# Patient Record
(demographics unavailable — no encounter records)

---

## 2024-10-08 NOTE — HISTORY OF PRESENT ILLNESS
[FreeTextEntry1] : Mr. Rivers is a pleasant 74 year old gentleman with a past medical history significant for CKD, DM II, HTN, HLD, TY, s/p JAY lobectomy for hemoptysis (2015), Hepatitis C s/p liver transplant, LBBB, CMP, CAD s/p PCI / stent (LAD) 7/2024 with Dr. Vieyra and paroxysmal atrial fibrillation with RVR who presents for initial evaluation.   He was in cardiac rehab on 7/24/24 and was noted to be in new atrial fibrillation and was sent to the ED where ECG confirmed AF.  He was started on Xarelto 15 mg daily and ASA was stopped. Subsequent LTM 7/2024 revealed a 4% AF burden but he denies any awareness of rapid/irregular heart action, dizziness, presyncope/syncope.    Of note, he had a newly reduced LVEF 6/2024 25-30%.  Now s/p stent 7/2024.  EF has recovered to 45%.  Participating in cardiac rehab three days per week.  At home he also does 30 minutes on the [employer], enjoys playing golf and softball.    TY is being treated with hypertonic saline.

## 2024-10-08 NOTE — CARDIOLOGY SUMMARY
[de-identified] : 10/8/24 SR @ 86 bpm, LBBB QRS duration 134 ms  [de-identified] : Yg 7/31 - 8/14/24: SR (46-), PAF 4% burden with HR range 53-), longest 5 hours 56 mins [de-identified] : TTE 9/17/24 1. Left ventricular systolic function is moderately decreased with an ejection fraction visually estimated at 45 % and Kaba's biplane of 45% 2. Abnormal septal motion consistent with left bundle branch block. 3. There is mild (grade 1) left ventricular diastolic dysfunction. 4. Left atrium is mildly dilated. 5. Trace mitral regurgitation. 6. Mild tricuspid regurgitation. 7. Trace pulmonic regurgitation. 8. Estimated pulmonary artery systolic pressure is 18 mmHg (IVC is not well visualized, RAP estimated 3mmHg). 9. Compared to prior done on 6/21/2024, increase in EF, septum consistent with LBBB. 10. Apical septal segment, apical lateral segment, and apex are abnormal.  TTE 6/23/24 1. Left ventricular systolic function is moderately to severely decreased with an ejection fraction of 31 % by Kaba's method of disks with an ejection fraction visually estimated at 25 to 30 %. 2. There is moderate (grade 2) left ventricular diastolic dysfunction. 3. Entire inferior wall, Entire inferoseptal wall, basal and mid anterior septum, apical anterior segment is abnormal. Severly hypokinetic apex. 4. The left atrium is mildly dilated. 5. Aortic arch is not well visualized. 6. There is focal calcification of the aortic valve leaflets (LCC) 7. Fibrocalcific aortic valve sclerosis without stenosis. 8. Thickened mitral valve leaflets. 9. Moderate mitral regurgitation. Regurg fraction=24% 10. Mild to moderate tricuspid regurgitation. 11. Mild pulmonic regurgitation. 12. Estimated pulmonary artery systolic pressure is 33 mmHg. 13. No pericardial effusion seen. 14. Normal pericardium. 15. Compared to outside prior on 11/2019- Decrease in overall LVEF with multiple segmental wall motion abnormalities.

## 2024-10-08 NOTE — DISCUSSION/SUMMARY
[FreeTextEntry1] : Mr. Rivers is a pleasant 74 year old gentleman with a past medical history significant for CKD, DM II, HTN, HLD, TY, s/p JAY lobectomy for hemoptysis (2015), Hepatitis C s/p liver transplant, LBBB, CMP, CAD s/p PCI / stent (LAD) 7/2024 with Dr. Vieyra and paroxysmal atrial fibrillation with RVR who presents for initial evaluation.   1.  Atrial fibrillation We discussed in detail the normal conduction system of the heart and what atrial fibrillation is.  We discussed the natural progression of this arrhythmia in the context of any existing comorbidities.  We also discussed the association between atrial fibrillation and thrombotic events / stroke.   We discussed treatment options for paroxysmal atrial fibrillation including rate control vs. rhythm control with antiarrhythmic medications or an ablation.  The patent was counseled on the fact that there is no cure for atrial fibrillation and that for long term control of atrial fibrillation a combination of strategies if often used.  Regardless of treatment options and maintenance of sinus rhythm, anticoagulation is still recommended based on CHADSVASC score.     Based on recent studies, atrial fibrillation is recommended as an option for first line therapy in patients with reduced LV function and those who cannot tolerate medical therapy.     Success in rhythm control management is best defined as improved quality of life, maintenance of sinus rhythm and reduced hospitalization since not all patients have continuous monitoring and .or symptoms to diagnose sub-clinical episodes.  Modern day ablation likely results in better long term outcomes compared to medical therapy alone, but repeat procedures and/or addition of medications may be required.  Results of ablation are better for paroxysmal patients compared to persistent patients, which are better than long-standing persistent patients.  Typical 3-5 year success rates (freedom from clinical atrial fibrillation) based on available literature over multiple procedures were quoted to the patient at approximately 80-90% for paroxysmal, 60-80% for persistent and 40-60% for long standing persistent.     The patient defers rhythm intervention at this time.  I strongly recommended continuous heart rhythm assessment with either an Apple watch or ILR; he will consider an apple watch.  This will help guide interventions as appears to be asymptomatic when he is in AFib.  2.  Stroke Risk CHADS VASc at least 5 Continue Eliquis for TE/CVA ppx  F/U 3 months, sooner as needed

## 2024-10-08 NOTE — HISTORY OF PRESENT ILLNESS
[FreeTextEntry1] : Mr. Rivers is a pleasant 74 year old gentleman with a past medical history significant for CKD, DM II, HTN, HLD, TY, s/p JAY lobectomy for hemoptysis (2015), Hepatitis C s/p liver transplant, LBBB, CMP, CAD s/p PCI / stent (LAD) 7/2024 with Dr. Vieyra and paroxysmal atrial fibrillation with RVR who presents for initial evaluation.   He was in cardiac rehab on 7/24/24 and was noted to be in new atrial fibrillation and was sent to the ED where ECG confirmed AF.  He was started on Xarelto 15 mg daily and ASA was stopped. Subsequent LTM 7/2024 revealed a 4% AF burden but he denies any awareness of rapid/irregular heart action, dizziness, presyncope/syncope.    Of note, he had a newly reduced LVEF 6/2024 25-30%.  Now s/p stent 7/2024.  EF has recovered to 45%.  Participating in cardiac rehab three days per week.  At home he also does 30 minutes on the [employer], enjoys playing golf and softball.    TY is being treated with hypertonic saline.

## 2024-10-08 NOTE — ADDENDUM
[FreeTextEntry1] :  I, Kannan Scott, personally performed the services described in the documentation, reviewed the documentation recorded by the scribe in my presence and it accurately and completely records my words and actions.

## 2024-10-09 NOTE — PLAN
[FreeTextEntry1] : HR 60 and beta blocked. Sinus no afib EF now 45. To have monitor with Apple watch for afib (4%) risk. Bleeding risk significant with many ecchymoses and 1 fall. To have discussion re: anticoagulation. LFTs stable Cr. elevated

## 2024-10-09 NOTE — PHYSICAL EXAM
[Alert] : alert [Scleral Icterus] : no scleral icterus [PERRLA] : pupils equal, round, reactive to light and accomodation [Hepatojugular Reflux] : no hepatojugular reflux [No Lymphadenopathy] : no lymphadenopathy [Normal Rate] : normal rate [Regular Rhythm] : regular rhythm [Abdominal Bruit] : no abdominal bruit [Ascites Fluid Wave] : no ascites fluid wave [Ascites Tense] : no ascites tense [Caput Medusae] : no caput medusae [Soft] : soft [No Edema] : no edema [No Skin Discoloration] : no skin discoloration [] : right femoral palpable [Spider Angioma] : no spider angioma [Jaundice] : no jaundice [Asterixis] : no asterixis [Hepatic Encephalopathy] : no hepatic encephalopathy [de-identified] : ecchymoses on abdomen [de-identified] : well healed no hernia [FreeTextEntry1] : Enzymes normal Cr. elevated though down from 2.1 now 1.7. standing Calcineurin and dm. Will follow with renal . No change. Hgb A1c  at 6.9. Discussed cr and dm, improved. Colonoscopy with re Diane. Everolimus 4.1 Hgb A1c 6.9 still would like improved. 40 mg atorvastatin. Great bleeding and ecchymoses and fall risk. Discussed with Dr. Looney. Doxi rx'd for Lyme's. Antibody remains +

## 2024-10-09 NOTE — HISTORY OF PRESENT ILLNESS
[Hepatitis C Virus] : Hepatitis C Virus [Liver] : Liver [TextBox_42] : Afib  heart rate tach during stress cardiac rehab, and stent placement. On xeralto and plavix with bleeding from scalp and face> Easy bruisability. EF now is 45 Sept 17th. In sinus now s/p OLTX 2014. [FreeTextEntry1] : Mild wheezing on Right

## 2024-10-09 NOTE — END OF VISIT
[Time Spent: ___ minutes] : I have spent [unfilled] minutes of time on the encounter which excludes teaching and separately reported services. [] : I personally saw and examined this patient.  My history and physical is based on my own examination and interaction with the patient and those present with ~him/her~, on available medical records, as well as on the reports and observations of other members of the team.

## 2024-10-09 NOTE — REVIEW OF SYSTEMS
[Fever] : no fever [Sore throat] : no sore throat [Sclera anicteric] : sclera icteric [Cough] : cough [Dysuria] : no dysuria [Frequency] : no frequency [Headache] : no headache [Anemia] : no anemia

## 2024-10-16 NOTE — HISTORY OF PRESENT ILLNESS
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 Diabetes developed in 2005 while he was receiving interferon therapy for Hep C. he is not aware of having any complications of diabetes (last eye exam 8/2016). he is on glimepiride 4 mg/day. HbA1C today - 6.5%, glucose - 121 mg/dl. He reports no hypoglycemia. 12/7/17. The patient is feeling well and has no complaints. HbA1C today is 6%, glucose - 119 mg/dl. His current dose of prednisone is 4 mg/day. His most recent eye exam was a few weeks ago - reportedly, without retinopathy. 9/25/18. The patient is doing very well on glimepiride 2 mg/day. HbA1C on 7/6/18 was 6.1%, on 9/13/18 - 6.3%. Glucose today is 225 mg/dl. He is now on 3 mg of prednisone daily. His next ophthalm. exam is in 2 days. He reports no hypoglycemia. 4/4/19. The patient is doing well. he continues on glimepiride 2 mg/day. He reports no hypoglycemia; has lost 8 lb. HbA1C today is 6.6%, glucose - 174 mg/dl. 10/3/19. The patient is doing very well. He has recently developed floaters and flashes in the Rt eye - is seeing ophthalmologist today. HbA1C was 6.8% on 9/4/19; glucose today is 174 mg/dl. His weight is stable. He reports no hypoglycemia. 7/15/2020. The patient is feeling well. His diabetes is in good control, with average BSs around 105 mg/dl. He is on glimepiride 2 mg/day. He reports no hypoglycemia. HbA1C was 6.6% on 6/4/2020. He has lost 6 lb. 10/13/21. The patient is doing well - has  no complaints. His weight is stable. He continues on 1mg glimepiride/day. He reports no hypoglycemia. Next opthalm. exam is scheduled in 2 weeks. Blood tests tomorrow in Dr. Cowan's office. CGM data reviewed. No hypoglycemic or hyperglycemic range. Average for the last 90 days - 116 mg/dl. HbA1C today is 6.2%, glucose - 134 mg/dl. 11/16/22. The patient is doing well. He has lost 7 lb since his previous visit, 16 lb since his first visit here on 7/12/2017. HbA1C today is 6.3%, glucose - 139 mg/dl. BSs at home range 90 - 130 mg/dl. He reports no hypoglycemia. 11/8/23. The patient is doing well. He has no complaints. His weight is stable. he reports no hypoglycemia. Glucose today is 169 mg/dl. HbA1C was 6.8% on 9/28/23. He is on glimepiride 1 mg/day and Jardiance 5 mg/day. 10/16/24. The patient is doing well but he had two coronary stents implanted on 6/27/24. HbA1C today is 6.9%, glucose - 180 mg/dl. He reports no hypoglycemia. His weight is stable.

## 2024-10-16 NOTE — ASSESSMENT
[FreeTextEntry1] : DM, type 2. S/P liver transplant CAD Hyperlipidemia CKD  Will continue current management Medications refilled. F/U - 3-6 month Repatha demonstrated

## 2024-10-17 NOTE — HISTORY OF PRESENT ILLNESS
[FreeTextEntry1] : DESIRAE BIANCHI  is a 74 year old  M  History of CAD, CMP, LBBB hepatitis C. Prior liver transplant. TY. Chronic kidney disease. Non-insulin-dependent diabetes. Hypertension and hyperlipidemia.  Chronic dyspnea which relates to his TY Referred by his physicians in [facility]. He spends part time between [facility]. Also spends the kenny in [place].  He remains physically active. He routinely does elliptical for 30 minutes. Also does light weights. Plays softball and golf.  Cardiac catheterization was performed at [facility] with Dr. Vieyra. Cardiac catheterization had significant coronary artery disease. There was two-vessel disease of the LAD and a large diagonal. Otherwise there were luminal irregularities and a right dominant circulation. IVUS guided PCI of the LAD and diagonal with drug-eluting stents.  Now in cardiac rehab.  7/24/24 was in cardiac rehab noted to have new onset AF. Was sent to ER. EKG confirms AF. He was discharged on xarelto 15mg daily and is off aspirin. Also taking plavix. Reports concern about bleeding risk. His transplant specialist already asked him to stop playing baseball. Lab hg 15.2, k4.7, cr 1.74, trop neg, , TFT wnl Followup monitor PAF 4% burden with overall controlled rates.  9/11/24 presented to cardiac rehab with AF with RVR today. Rate 130s pt asx but sent home. EKG did confirm AF rate 114 bpm. He rested at home and HR normalized. EKG in office t SR with known LBBB. There was stress around this time.   Last seen in the office 2 weeks ago.  Echocardiogram has mild LV dysfunction improved at 45%. There is abnormal septal motion related to conduction disease. Mild tricuspid regurgitation. No pulmonary hypertension.  He completed cardiac rehab. He is heading to [place] over the winter.  He has recently seen his nephrologist and liver transplant team.  He also saw EP discussed AF ablation which he is essentially declining at this time. He bought apple watch and has not noted any AF since wearing it. He declines watchman or ILR.  He is bothered by nuisance bruising and superficial bleeding on plavix and xarelto (renal dosing). States he solares his hair and blood drips down his face, even getting a pimple causes bleeding, he has curtailed his usual activity no longer playing baseball.   On my review of his labs before starting AC in July until now hgb 15.2 --> 13--> 12.6 --> 11.7 He has been getting labs monthly and has planned next month as well.   Recent LDL 86 his atorvastatin was increased to 80mg daily. He developed significant diarrhea and went back to 40mg.  His LDL 9/26/24 is 74.  Of note previously had increased LFTs on zetia.   He is looking into ID specialist for [place].   Non-smoker. Former  of [place] house. Brother with prior ablation.

## 2024-10-17 NOTE — ASSESSMENT
[FreeTextEntry1] : DESIRAE BIANCHI is a 74 year old M who presents today Oct 14, 2024 with the above history and the following active issues:   I-CMP S/P PCI 7/2024  There is improvement in left ventricular function from prior echocardiogram. Prior echocardiogram has an ejection fraction of 30% now is 45% In 2019 his ejection fraction was described as normal  Now on increased dose of beta blocker. Tolerating well. On SGLT2i and ACEi.  Will cont to trend EF.  Emphasized rhythm control. Saw EP. Now with apple watch no recurrent AF. Cont to monitor.  Completed cardiac rehab There is concern for excessive bleeding and decline in hgb on plavix and renally dosed xarelto. > 3 mo since PCI will stop plavix and instead take aspirin 81mg daily.  Cont xarelto 15mg daily.  Has repeat labs incl CBC planned again in coming weeks.  Reviewed red flag symptoms which would warrant emergent medical evaluation.  Coordinate care with his other specialists nephro and transplant.  PAF RVR EP eval BBl and a/c Educated patient on pathophysiology of atrial fibrillation and natural progression as well as associated risk of cardioembolic event. Informed him on indications for long term anticoagulation.  Reviewed bleeding precautions. Declines ablation, ILR, watchman. Risk/benefits reviewed.  Apple watch monitoring. Reinvolve EP for recurrent PAF.  history of diabetes, chronic kidney disease hypertension and hyperlipidemia. Instructed to notify our office if any new symptoms. ACE inhibitor and SGLT2 inhibitor. Renal function may limit use of MRA. Long-term would prefer not to take sulfonylurea. followup endo  Moderate mitral regurgitation. Carotid, aortic atherosclerosis. CKD Monitor valvular heart disease LV size and function.  ER precautions reviewed Close clinical follow-up planned with Dr. Wilkes Any questions and concerns were addressed and resolved.  Sincerely,  TRE Cruz Patients history, testing, and plan reviewed with supervising MD: Dr. Abhinav Scruggs

## 2024-10-17 NOTE — REVIEW OF SYSTEMS
[Easy Bleeding] : a tendency for easy bleeding [Easy Bruising] : a tendency for easy bruising [Negative] : Psychiatric

## 2024-11-13 NOTE — ASSESSMENT
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 * Recheck labs. * Will consider adding kerendia. Will hold for now given relatively lower BP. Will consider starting 5 mg daily (1/2 the usual dose). Will consider when he returns from [place] in March 2025.  * May benefit from GLP1 agonist. Defer to Dr. Garcia.  * Therapies for kidney disease: blood pressure control; DM control; proteinuria reduction with ARB/ACEi; SGLT2i; other evidence-based therapies recommended including exercise, a plant-based lower oxalate diet, and 400 mcg folic acid daily * Cardiovascular disease prevention: counseling on healthy diet, physical activity, weight loss, alcohol limitation, blood pressure control; cholesterol therapy; cardiology evaluation/followup advised * A counseling information sheet on CKD has been given (which they have been instructed to read). * The patient has been counseled that chronic kidney disease is a significant condition and regular office follow-up with me (at least every 2-4 months for now) is important for monitoring and their health, and that it is their responsibility to make a follow-up appointment. * The patient has been counseled never to stop taking their medications without discussing it with me or another doctor. * The patient has been counseled on avoiding NSAIDs. * The patient has been counseled on risk of worsening kidney function and instructed to immediately call and speak with me and go immediately to ER with any severe symptoms, nausea, vomiting, diarrhea, chest pain, or shortness of breath.  # HTN controlled. * Cont lisinopril 20/20 for proteinuria/elevated BP in office. Given controlled BP, will avoid kerendia currently. * The patient's blood pressure was checked with the [employer] [id number] using the SPRINT trial protocol after sitting quietly in an empty room with arm supported, back supported, and feet on the floor for 5 minutes. The average of 3 readings were taken. * A counseling information sheet on blood pressure and staying healthy has been given (which they have been instructed to read). * The patient has been counseled to check their BP at home with an automatic arm cuff, write down the readings, and reach me directly on the phone immediately if they are persistently > 180 systolic or if SBP is less than 100 or if lightheadedness develops. They were counseled to bring in all blood pressure readings and medications next visit. * The patient has been counseled that regular office follow-up (at least every 2-3 months for now) is important for monitoring and for their health, and that it is their responsibility to make follow up appointments. * The patient also has been counseled that they must never stop or change any medications without discussing this with me (or another physician).   # Hyperchol. * Recheck next visit.  # Respiratory infection. Hx of TY. * Pulm followup.  # DM. * Recheck HGA1c.

## 2024-11-13 NOTE — HISTORY OF PRESENT ILLNESS
[FreeTextEntry1] : Kindly referred by Dr. Cowan for CKD.  * Events reviewed. S/P PCI of LAD and diagonal. Also dx with PAF. *  LVEF increased from 25 to 45 after PCI. * DM controlled. *  BP controlled.  - 110s at home. SOB with moderate exertion. No CP. No lightheadedness. Compliant with medications. * CKD stable, creatinine 1.78. eGFR 40. 132 mg albuminuria. K 4.4.   Previous history (19Jun24): * BP controlled.  - 120s at home. No SOB with exertion. No CP. No lightheadedness. Compliant with medications. * CKD previously stable, creatinine 1.62 in 19Mar. 98 mg albuminuria. * Breathing comfortably. Continuing inhalation therapy. * No further syncope.   Previous history (19Mar24): * Events reviewed. ER visits on March 6 and March 12 for respiratory illness with cough. Covid and Flu-. Chest CT c/w known TY. Treated with bronchodilators and azithromycin. Creatinine 2 on March 6 and 1.6 on March 12. On March 12 felt presyncopal and evaluated by EPS and sent home with monitor. No further episodes of presyncope or passing out. Wheezing now improved. * BP controlled.  No SOB with exertion. No CP. No lightheadedness. Compliant with medications.   Previous history (29Nov23): * Cr 1.93, eGFR 36 by CKD-EPIcr. 31 mg albuminuria. HG* BP controlled. /64 at home on average. No hypotension. No lightheadedness. No CP/SOB. Compliant with medications. * HGA1c 6.7. * Feels well on jardiance. * K 4.3. * . Atorvastatin 20 mg daily. Only takes 4x a week.   Previous history (23Aug23): * On Jardiance. Feels well. eGFR 36 ([id number]). 115 mg albuminuria. * DM controlled. HGA1c 7. * BP controlled. /64 at home. No lightheadedness. No CP/SOB. Compliant with medications. On amlodipine 2.5 only. On lisinopril 20/15.   Previous history (20Jul23): * BP controlled. BP 120s at home. No lightheadedness. No CP/SOB.Compliant with medications. * eGFR 41 ([id number]). 185 mg albuminuria. * DM controlled, HGA1c 6.9. * Bicarb increased to 1 bid. HCO3 18.   Previous history (23May23): * eGFR 43 (cystatin C 1.6, EKFC eGFRcys, [facility] 2023). * 128 mg albuminuria. * K 5.5. Now following low K diet. *  * HTN controlled. BP 120s at home. No lightheadedness. No CP/SOB. Compliant with medications. * Bicarb increased to 1 bid.   Previous history (29Mar23): Labs reviewed. Creatinine stable at 1.6 - 1.8 since 2021. Creatinine 1.3 - 1.4  in 2016, increased to 1.5 in 2017 and 1.7 in 2018. Most recent creatinine 1.86 (eGFR 38 by CKD-EPI). 1+ protein, no hematuria.   The patient denies exposure to chronic NSAIDs, chronic PPIs, green smoothies, creatine, or herbal supplements. The patient denies a history of kidney stones or pyelonephritis. 3-5x night nocturia. No recent renal ultrasound. They are unaware of proteinuria or hematuria.  OLTX related to hep C 2014. Tretaed with everolimus, tacrolimus 0.5 TIW with levels < 2. LFTs normal.   DM rx with glimperide 1 mg daily. HGA1c 7. DM dx 2005. No known retinopathy. No neuropathy.   * HTN borderline uncontrolled. BP 130s at home. No lightheadedness. No CP/SOB. Compliant with medications.   In 2017 he had a golf ball hit his right kidney with a hematoma (uncertain if retroperitoneal) and dx with HTN after that.   Brother has kidney disease of unclear cause requiring transplant. Also had hep c.   Hep C cured.

## 2024-11-13 NOTE — PHYSICAL EXAM
[General Appearance - Alert] : alert [General Appearance - In No Acute Distress] : in no acute distress [Neck Appearance] : the appearance of the neck was normal [Neck Cervical Mass (___cm)] : no neck mass was observed [Jugular Venous Distention Increased] : there was no jugular-venous distention [Thyroid Diffuse Enlargement] : the thyroid was not enlarged [Thyroid Nodule] : there were no palpable thyroid nodules [Heart Rate And Rhythm] : heart rate was normal and rhythm regular [Heart Sounds] : normal S1 and S2 [Heart Sounds Gallop] : no gallops [Murmurs] : no murmurs [Heart Sounds Pericardial Friction Rub] : no pericardial rub [Bowel Sounds] : normal bowel sounds [Abdomen Soft] : soft [Abdomen Tenderness] : non-tender [Abdomen Mass (___ Cm)] : no abdominal mass palpated [] : no hepato-splenomegaly [Cervical Lymph Nodes Enlarged Posterior Bilaterally] : posterior cervical [Cervical Lymph Nodes Enlarged Anterior Bilaterally] : anterior cervical [Supraclavicular Lymph Nodes Enlarged Bilaterally] : supraclavicular [FreeTextEntry1] : pitting ankles

## 2024-11-19 NOTE — HISTORY OF PRESENT ILLNESS
[FreeTextEntry1] : DESIRAE BIANCHI  is a 74 year old  M Last seen October 2024.  In the interim had follow-up with endocrinology A1c was 7.0.  Also seen by nephrology.  Coordinate care with outside specialist.  The interim he completed cardiac rehab.  He is back at [facility].  He does elliptical and rowing machine.  He is heading to [place] for the winter months.  He wears his Apple Watch.  There has been no reported atrial fibrillation.  He presently is on aspirin and Xarelto.  Reviewed long-term LDL goal and threshold.  Follow-up blood work when he returns.  Follow-up echocardiogram and ischemic evaluation.  History of CAD, CMP, LBBB hepatitis C. Prior liver transplant. TY. Chronic kidney disease. Non-insulin-dependent diabetes. Hypertension and hyperlipidemia.  Chronic dyspnea which relates to his TY Referred by his physicians in [facility]. He spends part time between [facility]. Also spends the kenny in [place].  Cardiac catheterization was performed at [facility] with Dr. Vieyra. Cardiac catheterization had significant coronary artery disease. There was two-vessel disease of the LAD and a large diagonal. Otherwise there were luminal irregularities and a right dominant circulation. IVUS guided PCI of the LAD and diagonal with drug-eluting stents.  7/24/24 was in cardiac rehab noted to have new onset AF. Was sent to ER. EKG confirms AF. He was discharged on xarelto 15mg daily and is off aspirin. Also taking plavix. Reports concern about bleeding risk. His transplant specialist already asked him to stop playing baseball. Lab hg 15.2, k4.7, cr 1.74, trop neg, , TFT wnl Followup monitor PAF 4% burden with overall controlled rates.  9/11/24 presented to cardiac rehab with AF with RVR Rate 130s pt asx but sent home. EKG did confirm AF rate 114 bpm. He rested at home and HR normalized. EKG in office  SR with known LBBB. There was stress around this time.   Echocardiogram has mild LV dysfunction improved at 45%. There is abnormal septal motion related to conduction disease. Mild tricuspid regurgitation. No pulmonary hypertension.  He completed cardiac rehab. He is heading to [place] over the winter.   He bought apple watch and has not noted any AF since wearing it.  Recent LDL 86 his atorvastatin was increased to 80mg daily. He developed significant diarrhea and went back to 40mg.  His LDL 9/26/24 is 74.  Of note previously had increased LFTs on zetia.   non-smoker. Former  of [place] house. Brother with prior ablation.  iCMP S/P PCI 7/2024  There is improvement in left ventricular function from prior echocardiogram. Prior echocardiogram has an ejection fraction of 30% now is 45% In 2019 his ejection fraction was described as normal  Now on increased dose of beta blocker. Tolerating well. On SGLT2i and ACEi.   PAF RVR BBl and a/c Educated patient on pathophysiology of atrial fibrillation and natural progression as well as associated risk of cardioembolic event. Informed him on indications for long term anticoagulation.  Reviewed bleeding precautions. Declines ablation, ILR, watchman. Apple watch monitoring. Reinvolve EP for recurrent PAF.  history of diabetes, chronic kidney disease hypertension and hyperlipidemia. Instructed to notify our office if any new symptoms. ACE inhibitor and SGLT2 inhibitor. Renal function may limit use of MRA. Long-term would prefer not to take sulfonylurea. followup endo  Moderate mitral regurgitation. Carotid, aortic atherosclerosis. CKD Monitor valvular heart disease LV size and function.  Any questions and concerns were addressed and resolved.

## 2024-11-19 NOTE — HISTORY OF PRESENT ILLNESS
[FreeTextEntry1] : DESIRAE BIANCHI  is a 74 year old  M Last seen October 2024.  In the interim had follow-up with endocrinology A1c was 7.0.  Also seen by nephrology.  Coordinate care with outside specialist.  The interim he completed cardiac rehab.  He is back at [facility].  He does elliptical and rowing machine.  He is heading to [place] for the winter months.  He wears his Apple Watch.  There has been no reported atrial fibrillation.  He presently is on aspirin and Xarelto.  Reviewed long-term LDL goal and threshold.  Follow-up blood work when he returns.  Follow-up echocardiogram and ischemic evaluation.  History of CAD, CMP, LBBB hepatitis C. Prior liver transplant. TY. Chronic kidney disease. Non-insulin-dependent diabetes. Hypertension and hyperlipidemia.  Chronic dyspnea which relates to his TY Referred by his physicians in [facility]. He spends part time between [facility]. Also spends the kenny in [place].  Cardiac catheterization was performed at [facility] with Dr. Vieyra. Cardiac catheterization had significant coronary artery disease. There was two-vessel disease of the LAD and a large diagonal. Otherwise there were luminal irregularities and a right dominant circulation. IVUS guided PCI of the LAD and diagonal with drug-eluting stents.  7/24/24 was in cardiac rehab noted to have new onset AF. Was sent to ER. EKG confirms AF. He was discharged on xarelto 15mg daily and is off aspirin. Also taking plavix. Reports concern about bleeding risk. His transplant specialist already asked him to stop playing baseball. Lab hg 15.2, k4.7, cr 1.74, trop neg, , TFT wnl Followup monitor PAF 4% burden with overall controlled rates.  9/11/24 presented to cardiac rehab with AF with RVR Rate 130s pt asx but sent home. EKG did confirm AF rate 114 bpm. He rested at home and HR normalized. EKG in office  SR with known LBBB. There was stress around this time.   Echocardiogram has mild LV dysfunction improved at 45%. There is abnormal septal motion related to conduction disease. Mild tricuspid regurgitation. No pulmonary hypertension.  He completed cardiac rehab. He is heading to [place] over the winter.   He bought apple watch and has not noted any AF since wearing it.  Recent LDL 86 his atorvastatin was increased to 80mg daily. He developed significant diarrhea and went back to 40mg.  His LDL 9/26/24 is 74.  Of note previously had increased LFTs on zetia.   non-smoker. Former  of [place] house. Brother with prior ablation.  iCMP S/P PCI 7/2024  There is improvement in left ventricular function from prior echocardiogram. Prior echocardiogram has an ejection fraction of 30% now is 45% In 2019 his ejection fraction was described as normal  Now on increased dose of beta blocker. Tolerating well. On SGLT2i and ACEi.   PAF RVR BBl and a/c Educated patient on pathophysiology of atrial fibrillation and natural progression as well as associated risk of cardioembolic event. Informed him on indications for long term anticoagulation.  Reviewed bleeding precautions. Declines ablation, ILR, watchman. Apple watch monitoring. Reinvolve EP for recurrent PAF.  history of diabetes, chronic kidney disease hypertension and hyperlipidemia. Instructed to notify our office if any new symptoms. ACE inhibitor and SGLT2 inhibitor. Renal function may limit use of MRA. Long-term would prefer not to take sulfonylurea. followup endo  Moderate mitral regurgitation. Carotid, aortic atherosclerosis. CKD Monitor valvular heart disease LV size and function.  Any questions and concerns were addressed and resolved.

## 2024-12-18 NOTE — PHYSICAL EXAM
[Alert] : alert [PERRLA] : pupils equal, round, reactive to light and accomodation [No Lymphadenopathy] : no lymphadenopathy [Normal Rate] : normal rate [Regular Rhythm] : regular rhythm [Soft] : soft [No Skin Discoloration] : no skin discoloration [] : right femoral palpable [Scleral Icterus] : no scleral icterus [Hepatojugular Reflux] : no hepatojugular reflux [Abdominal Bruit] : no abdominal bruit [Ascites Fluid Wave] : no ascites fluid wave [Ascites Tense] : no ascites tense [Caput Medusae] : no caput medusae [Spider Angioma] : no spider angioma [Jaundice] : no jaundice [Asterixis] : no asterixis [Hepatic Encephalopathy] : no hepatic encephalopathy [de-identified] : ecchymoses on abdomen gone [de-identified] : well healed no hernia [FreeTextEntry1] : Enzymes normal Cr. elevated though down from 2.1 now 1.85. standing Calcineurin and dm. Will follow with renal . No change. Hgb A1c  at 6.8. Discussed cr and dm, improved. Colonoscopy with re Diane. Everolimus 4.1 Hgb A1c 6.8-7.2 still would like improved. 40 mg atorvastatin. Discussed with Dr. Looney. Doxi rx'd for Lyme's. Antibody remains +. Trace edema.

## 2024-12-18 NOTE — REVIEW OF SYSTEMS
[Cough] : cough [Fever] : no fever [Sore throat] : no sore throat [Sclera anicteric] : sclera icteric [Dysuria] : no dysuria [Frequency] : no frequency [Headache] : no headache [Anemia] : no anemia

## 2024-12-18 NOTE — HISTORY OF PRESENT ILLNESS
[Hepatitis C Virus] : Hepatitis C Virus [Liver] : Liver [Not Working] : Not working [100: Normal, no complaints, no evidence of disease.] : 100: Normal, no complaints, no evidence of disease. [TextBox_42] : Afib  heart rate tach during stress cardiac rehab, and stent placement. On xeralto and plavix with bleeding from scalp and face> Easy bruisability. EF now is 45 Sept 17th. In sinus now s/p OLTX 2014. Mild edema and Cr.1.85 k 4.9, labs from 11/16: hgbA1c 6.8-7.2, ast 24 alt 36 tb .3 egfr 38 tacro <2. [FreeTextEntry1] : Mild wheezing on Right

## 2024-12-18 NOTE — PLAN
[FreeTextEntry1] : HR 66 and beta blocked. Sinus no afib EF now 45. To have monitor with Apple watch for afib (4%) risk. Bleeding risk significant with many ecchymoses and 1 fall. To have discussion re: anticoagulation now asa and xaralto. LFTs stable Cr. elevated, will need better glucose and bp control. Meds as per Dr. De La Rosa and Dr. Garcia. No change on immuno. Cholesterol and triglycerides in range.

## 2024-12-19 NOTE — HISTORY OF PRESENT ILLNESS
[FreeTextEntry1] : 73 y/o M with PMH of CAD, HTN, HLD, DMII, TY, A. fib (on Xeralto), Hep C, Primary liver cancer, BCC, PSHx of Liver transplant (2014), and Lung resection is referred by Dr. Cowan for hemorrhoids. He had the hemorrhoid for about 3 weeks. He used OTC creams initially and now he is using hydrocortisone and doing sitz baths. Hemorrhoids appeared after few days of constipation. Hemorrhoid is external. Pain is better now but still has pain. Sees some blood when wiping.  Bowel movements are daily x 1-2. Stools are soft. Denies need to strain. Has pain with BM. Pain is mostly during the bowel movements. Takes stool softeners.   Last colonoscopy was in 2023 and had no polyps.

## 2024-12-19 NOTE — PHYSICAL EXAM
[Wart] : a wart [Nonprolapsing] : a nonprolapsing (grade I) [Skin Tags] : residual hemorrhoidal skin tags were noted [Normal] : was normal [None] : there was no rectal mass  [Normal Breath Sounds] : Normal breath sounds [Normal Heart Sounds] : normal heart sounds [2+] : left 2+ [No Rash or Lesion] : No rash or lesion [Alert] : alert [Oriented to Person] : oriented to person [Oriented to Place] : oriented to place [Oriented to Time] : oriented to time [Calm] : calm [Abdomen Masses] : No abdominal masses [Abdomen Tenderness] : ~T No ~M abdominal tenderness [Excoriation] : no perianal excoriation [Fistula] : no fistulas [Ulcer ___ cm] : no ulcers [Tender, Swollen] : nontender, non-swollen [Thrombosed] : that was not thrombosed [Stool Sample Taken] : no stool obtained on rectal exam [Gross Blood] : no gross blood [de-identified] : benign, well healed scars, non distended [de-identified] : left posterior skin tag.  left lateral small warts and fibrotic changes causing narrowing of canal.  digital: tone WNL.  anoscopy (stricture scope): 3 mucosal warts,  no idiopathic fissure or sixable internal hemorrhoid seen   [de-identified] : NAD

## 2024-12-19 NOTE — REVIEW OF SYSTEMS
[Easy Bleeding] : a tendency for easy bleeding [Easy Bruising] : a tendency for easy bruising [Negative] : Endocrine [Fever] : no fever [Chills] : no chills [Feeling Poorly] : not feeling poorly [Feeling Tired] : not feeling tired [Recent Weight Gain (___ Lbs)] : no recent weight gain [Recent Weight Loss (___ Lbs)] : no recent weight loss [Shortness Of Breath] : no shortness of breath [Wheezing] : no wheezing [Cough] : no cough [SOB on Exertion] : no shortness of breath during exertion [Abdominal Pain] : no abdominal pain [Vomiting] : no vomiting [Constipation] : no constipation [Diarrhea] : no diarrhea [Dysuria] : no dysuria [Incontinence] : no incontinence [Hesitancy] : no urinary hesitancy [Nocturia] : no nocturia [Itching] : no itching [Confused] : no confusion [Convulsions] : no convulsions [Dizziness] : no dizziness [Fainting] : no fainting [Suicidal] : not suicidal [Anxiety] : no anxiety [Depression] : no depression

## 2024-12-19 NOTE — ASSESSMENT
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  Internal lesions as well.   Mild anal stenosis of unclear etiology. Had colonoscopy done recently.  Needs EUA, excision and fulguration. On xarelto for coated coronary stents that were place in July 2024.  Needs to be off x 3-4 days prior.  Need cardiology clearance.   DM, on jardiance:  Would need to stop 3 days prior as well. Needs cardiac, transplant, and general medical clearane. Information about warts give, High risk of recurrence explained and understood. Need for frequent surveilance understood.

## 2024-12-19 NOTE — REASON FOR VISIT
[Initial Evaluation] : an initial evaluation [Family Member] : family member [FreeTextEntry1] : hemorrhoid

## 2024-12-24 NOTE — HISTORY OF PRESENT ILLNESS
[Atrial Fibrillation] : atrial fibrillation [Coronary Artery Disease] : coronary artery disease [No Adverse Anesthesia Reaction] : no adverse anesthesia reaction in self or family member [Chronic Anticoagulation] : chronic anticoagulation [Chronic Kidney Disease] : chronic kidney disease [Diabetes] : diabetes [(Patient denies any chest pain, claudication, dyspnea on exertion, orthopnea, palpitations or syncope)] : Patient denies any chest pain, claudication, dyspnea on exertion, orthopnea, palpitations or syncope [Good (7-10 METs)] : Good (7-10 METs) [No Pertinent Pulmonary History] : no history of asthma, COPD, sleep apnea, or smoking [Aortic Stenosis] : no aortic stenosis [Recent Myocardial Infarction] : no recent myocardial infarction [Implantable Device/Pacemaker] : no implantable device/pacemaker [FreeTextEntry1] : EUA, excision and fulguration of Anal condylomata  [FreeTextEntry2] : 12/26/24 [FreeTextEntry3] : Dr. Benson [FreeTextEntry4] : Pt is a 75 y/o M with PMHx of Hep C s/p liver transplant, PAF, CAD s/p PCI / stent (LAD) 7/2024 who presents to the office today for medical clearance for EUA, excision and fulguration of Anal condylomata   Pt is feeling well.

## 2024-12-24 NOTE — ASSESSMENT
[High Risk Surgery - Intraperitoneal, Intrathoracic or Supringuinal Vascular Procedures] : High Risk Surgery - Intraperitoneal, Intrathoracic or Supringuinal Vascular Procedures - No (0) [Ischemic Heart Disease] : Ischemic Heart Disease  - Yes (1) [Congestive Heart Failure] : Congestive Heart Failure - No (0) [Prior Cerebrovascular Accident or TIA] : Prior Cerebrovascular Accident or TIA - No (0) [Insulin-dependent Diabetic (1 Point)] : Insulin-dependent Diabetic - No (0) [Creatinine >= 2mg/dL (1 Point)] : Creatinine >= 2mg/dL - No (0) [1] : 1 , RCRI Class: II, Risk of Post-Op Cardiac Complications: 6.0%, 95% CI for Risk Estimate: 4.9% - 7.4% [Patient Optimized for Surgery] : Patient optimized for surgery [No Further Testing Recommended] : no further testing recommended [As per surgery] : as per surgery [Modify medications prior to procedure] : Modify medications prior to procedure [FreeTextEntry4] : Pt is MODERATE risk for LOW-risk procedure. Pt may proceed with elective surgery. [FreeTextEntry7] : Pt has spoken to cardio and Transplant team. Stopping Xarelto, Jardiance since 12/22. He was instructed to reduce everolimus to 1 tab of 0.5 mg daily and increase tacrolimus to 1 tab 0.5 mg every morning and evening.   Patient is instructed to hold Glimepiride morning of surgery

## 2024-12-24 NOTE — HISTORY OF PRESENT ILLNESS
[Atrial Fibrillation] : atrial fibrillation [Coronary Artery Disease] : coronary artery disease [No Adverse Anesthesia Reaction] : no adverse anesthesia reaction in self or family member [Chronic Anticoagulation] : chronic anticoagulation [Chronic Kidney Disease] : chronic kidney disease [Diabetes] : diabetes [(Patient denies any chest pain, claudication, dyspnea on exertion, orthopnea, palpitations or syncope)] : Patient denies any chest pain, claudication, dyspnea on exertion, orthopnea, palpitations or syncope [Good (7-10 METs)] : Good (7-10 METs) [No Pertinent Pulmonary History] : no history of asthma, COPD, sleep apnea, or smoking [Aortic Stenosis] : no aortic stenosis [Recent Myocardial Infarction] : no recent myocardial infarction [Implantable Device/Pacemaker] : no implantable device/pacemaker [FreeTextEntry1] : EUA, excision and fulguration of Anal condylomata  [FreeTextEntry2] : 12/26/24 [FreeTextEntry3] : Dr. Benson [FreeTextEntry4] : Pt is a 73 y/o M with PMHx of Hep C s/p liver transplant, PAF, CAD s/p PCI / stent (LAD) 7/2024 who presents to the office today for medical clearance for EUA, excision and fulguration of Anal condylomata   Pt is feeling well.

## 2024-12-24 NOTE — ASSESSMENT
[High Risk Surgery - Intraperitoneal, Intrathoracic or Supringuinal Vascular Procedures] : High Risk Surgery - Intraperitoneal, Intrathoracic or Supringuinal Vascular Procedures - No (0) [Ischemic Heart Disease] : Ischemic Heart Disease  - Yes (1) [Congestive Heart Failure] : Congestive Heart Failure - No (0) [Prior Cerebrovascular Accident or TIA] : Prior Cerebrovascular Accident or TIA - No (0) [Creatinine >= 2mg/dL (1 Point)] : Creatinine >= 2mg/dL - No (0) [Insulin-dependent Diabetic (1 Point)] : Insulin-dependent Diabetic - No (0) [1] : 1 , RCRI Class: II, Risk of Post-Op Cardiac Complications: 6.0%, 95% CI for Risk Estimate: 4.9% - 7.4% [Patient Optimized for Surgery] : Patient optimized for surgery [No Further Testing Recommended] : no further testing recommended [As per surgery] : as per surgery [Modify medications prior to procedure] : Modify medications prior to procedure [FreeTextEntry4] : Pt is MODERATE risk for LOW-risk procedure. Pt may proceed with elective surgery. [FreeTextEntry7] : Pt has spoken to cardio and Transplant team. Stopping Xarelto, Jardiance since 12/22. He was instructed to reduce everolimus to 1 tab of 0.5 mg daily and increase tacrolimus to 1 tab 0.5 mg every morning and evening.   Patient is instructed to hold Glimepiride morning of surgery

## 2024-12-24 NOTE — END OF VISIT
[FreeTextEntry3] : I, Dr. Looney, personally performed the evaluation and management (E/M) services for this established patient who presents today with (a) new problem(s)/exacerbation of (an) existing condition(s).  That E/M includes conducting the examination, assessing all new/exacerbated conditions, and establishing a new plan of care.  Today, my PA, Kami Galeas, was here to observe my evaluation and management services for this new problem/exacerbated condition to be followed going forward.

## 2024-12-24 NOTE — PHYSICAL EXAM
[No Acute Distress] : no acute distress [Well-Appearing] : well-appearing [Normal Sclera/Conjunctiva] : normal sclera/conjunctiva [Normal Rate] : normal rate  [Regular Rhythm] : with a regular rhythm [Normal] : affect was normal and insight and judgment were intact

## 2024-12-30 NOTE — PHYSICAL EXAM
[General Appearance - In No Acute Distress] : no acute distress [] : no respiratory distress oral [Urethral Meatus] : meatus normal [Penis Abnormality] : normal circumcised penis [Oriented To Time, Place, And Person] : oriented to person, place, and time

## 2024-12-30 NOTE — ASSESSMENT
[FreeTextEntry1] : 74-year-old male presents with post-op urinary retention. Successful TOV today. Continue flomax x1 week. Can restart flomax if any difficulty urinating once stopping.   Hx elevated PSAs. Should f/u once healed from surgery to continue surveillance.   - continue tamsulosin x1 week - RTC 1 month (PVR, PSA)

## 2024-12-30 NOTE — HISTORY OF PRESENT ILLNESS
[FreeTextEntry1] : 74-year-old male presents with post-op urinary retention. S/p anal wart removal on 12/26 with Dr. Benson. Initally able to urinate ok in PACU but became increasingly more difficult in the evening. Presented to ED and cooper was placed. Started on tamsulosin 0.4mg. Prior to retention, no urinary issues. Has some nocturia but attributes it to high volume water intake during the evening. Is not bothered by the nocturia. Has complicated medical hx with s/p liver transplant, lung resection on immunosuppression. States had catheter after his transplant, but no difficulty urinating after removal.     TOV: - 240 cc sterile water instilled via existing catheter - Catheter removed in its entirety - Patient voided 200 cc  - Final  ml

## 2025-01-06 NOTE — ASSESSMENT
[FreeTextEntry1] : Post op visit. S/p anal wart excision on 12/26/24.  Pathology showed condyloma acuminata.  Pathology discussed with patient.  Seen with Dr. Benson Doing well overall.  Wound healing but not healed yet. No active bleeding or drainage noted.  Continue with Sitz baths, only 4-5 minutes.  Continue with Stool softeners.  RTC in 3 months for follow up when returns from [place].  Will need routine follow up every 3-4 months for longtime.

## 2025-01-06 NOTE — HISTORY OF PRESENT ILLNESS
[FreeTextEntry1] : 75 y/o M with PMH of CAD, HTN, HLD, DMII, TY, A. fib (on Xeralto), Hep C, Primary liver cancer, BCC, PSHx of Liver transplant (2014), and Lung resection is referred by Dr. Cowan for hemorrhoids.  On exam he was found to have left posterior skin tag, left lateral small warts and fibrotic changes causing narrowing of canal. On anoscopy (stricture scope): 3 mucosal warts seen. He is now s/p Exam under anesthesia, excision of anal lesions, question condylomata and excision of anal papilla x2 on 12/26/24. Post op complicated by urinary retention requiring cooper insertion. Cooper was removed on 12/30/24 by urologist. He is here for post op visit. He has been urinating freely.  Bowel movements are daily x2-3. Stools are soft. Had some blood on tissue once. Denies pain or bleeding otherwise.  Last colonoscopy was in 2023 and had no polyps.

## 2025-01-06 NOTE — PHYSICAL EXAM
[No Rash or Lesion] : No rash or lesion [Alert] : alert [Oriented to Person] : oriented to person [Oriented to Place] : oriented to place [Oriented to Time] : oriented to time [Calm] : calm [Purpura] : no purpura  [Petechiae] : no petechiae [Skin Ulcer] : no ulcer [Skin Induration] : no induration [de-identified] : NAD [de-identified] : Wound healing. No active bleeding or drainage noted.

## 2025-01-06 NOTE — ASSESSMENT
[FreeTextEntry1] : Post op visit. S/p anal wart excision on 12/26/24.  Pathology showed condyloma acuminata.  Pathology discussed with patient.  Seen with Dr. Benson Doing well overall.  Wound healing but not healed yet. No active bleeding or drainage noted.  Continue with Sitz baths, only 4-5 minutes.  Continue with Stool softeners.  RTC in 3 months for follow up when returns from [place].  Will need routine follow up every 3-4 months for longtime.

## 2025-01-06 NOTE — HISTORY OF PRESENT ILLNESS
[FreeTextEntry1] : 73 y/o M with PMH of CAD, HTN, HLD, DMII, TY, A. fib (on Xeralto), Hep C, Primary liver cancer, BCC, PSHx of Liver transplant (2014), and Lung resection is referred by Dr. Cowan for hemorrhoids.  On exam he was found to have left posterior skin tag, left lateral small warts and fibrotic changes causing narrowing of canal. On anoscopy (stricture scope): 3 mucosal warts seen. He is now s/p Exam under anesthesia, excision of anal lesions, question condylomata and excision of anal papilla x2 on 12/26/24. Post op complicated by urinary retention requiring cooper insertion. Cooper was removed on 12/30/24 by urologist. He is here for post op visit. He has been urinating freely.  Bowel movements are daily x2-3. Stools are soft. Had some blood on tissue once. Denies pain or bleeding otherwise.  Last colonoscopy was in 2023 and had no polyps.

## 2025-01-06 NOTE — PHYSICAL EXAM
[No Rash or Lesion] : No rash or lesion [Alert] : alert [Oriented to Person] : oriented to person [Oriented to Place] : oriented to place [Oriented to Time] : oriented to time [Calm] : calm [Purpura] : no purpura  [Petechiae] : no petechiae [Skin Ulcer] : no ulcer [Skin Induration] : no induration [de-identified] : NAD [de-identified] : Wound healing. No active bleeding or drainage noted.

## 2025-03-19 NOTE — ASSESSMENT
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 * Recheck labs. * Will consider adding kerendia. Will hold for now given relatively lower BP. Will consider starting 5 mg daily (1/2 the usual dose). Will consider when he returns from [place] in March 2025.  * May benefit from GLP1 agonist. Defer to Dr. Garcia.  * Therapies for kidney disease: blood pressure control; DM control; proteinuria reduction with ARB/ACEi; SGLT2i; other evidence-based therapies recommended including exercise, a plant-based lower oxalate diet, and 400 mcg folic acid daily * Cardiovascular disease prevention: counseling on healthy diet, physical activity, weight loss, alcohol limitation, blood pressure control; cholesterol therapy; cardiology evaluation/followup advised * A counseling information sheet on CKD has been given (which they have been instructed to read). * The patient has been counseled that chronic kidney disease is a significant condition and regular office follow-up with me (at least every 3-4 months for now) is important for monitoring and their health, and that it is their responsibility to make a follow-up appointment. * The patient has been counseled never to stop taking their medications without discussing it with me or another doctor. * The patient has been counseled on avoiding NSAIDs. * The patient has been counseled on risk of worsening kidney function and instructed to immediately call and speak with me and go immediately to ER with any severe symptoms, nausea, vomiting, diarrhea, chest pain, or shortness of breath.  # HTN controlled. * Cont lisinopril 20/20 for proteinuria/elevated BP in office. Given controlled BP, will avoid kerendia currently. * The patient's blood pressure was checked with the [employer] [id number]-[id number] using the SPRINT trial protocol after sitting quietly in an empty room with arm supported, back supported, and feet on the floor for 5 minutes. The average of 3 readings were taken. * A counseling information sheet on blood pressure and staying healthy has been given (which they have been instructed to read). * The patient has been counseled to check their BP at home with an automatic arm cuff, write down the readings, and reach me directly on the phone immediately if they are persistently > 180 systolic or if SBP is less than 100 or if lightheadedness develops. They were counseled to bring in all blood pressure readings and medications next visit. * The patient has been counseled that regular office follow-up (at least every 3-4 months for now) is important for monitoring and for their health, and that it is their responsibility to make follow up appointments. * The patient also has been counseled that they must never stop or change any medications without discussing this with me (or another physician).   # Hyperchol. * Recheck next visit.  # Respiratory infection. Hx of TY. * Pulm followup.  # DM. * Recheck HGA1c.

## 2025-03-19 NOTE — PHYSICAL EXAM
[General Appearance - Alert] : alert [General Appearance - In No Acute Distress] : in no acute distress [Neck Appearance] : the appearance of the neck was normal [Neck Cervical Mass (___cm)] : no neck mass was observed [Jugular Venous Distention Increased] : there was no jugular-venous distention [Thyroid Diffuse Enlargement] : the thyroid was not enlarged [Thyroid Nodule] : there were no palpable thyroid nodules [Heart Rate And Rhythm] : heart rate was normal and rhythm regular [Heart Sounds] : normal S1 and S2 [Heart Sounds Gallop] : no gallops [Murmurs] : no murmurs [Heart Sounds Pericardial Friction Rub] : no pericardial rub [Bowel Sounds] : normal bowel sounds [Abdomen Soft] : soft [Abdomen Tenderness] : non-tender [] : no hepato-splenomegaly [Abdomen Mass (___ Cm)] : no abdominal mass palpated [Cervical Lymph Nodes Enlarged Posterior Bilaterally] : posterior cervical [Cervical Lymph Nodes Enlarged Anterior Bilaterally] : anterior cervical [Supraclavicular Lymph Nodes Enlarged Bilaterally] : supraclavicular [FreeTextEntry1] : pitting ankles

## 2025-03-19 NOTE — ASSESSMENT
[FreeTextEntry1] : 74-year-old male presented with post-op urinary retention in 12/2024. Retention has resolved, urinating back to baseline with satisfactory symptoms, not taking medications for prostate.   Hx elevated PSAs, not checked since 2022 given other health issues. Can consider checking with Dr. Looney with next routine bloodwork.  - f/u prn

## 2025-03-19 NOTE — LETTER BODY
[Dear  ___] : Dear  [unfilled], [Courtesy Letter:] : I had the pleasure of seeing your patient, [unfilled], in my office today. [Please see my note below.] : Please see my note below. [Consult Closing:] : Thank you very much for allowing me to participate in the care of this patient.  If you have any questions, please do not hesitate to contact me. [Sincerely,] : Sincerely, [FreeTextEntry3] : Mary Cheatham MD

## 2025-03-19 NOTE — PHYSICAL EXAM
[Normal Appearance] : normal appearance [Well Groomed] : well groomed [General Appearance - In No Acute Distress] : no acute distress [Edema] : no peripheral edema [Exaggerated Use Of Accessory Muscles For Inspiration] : no accessory muscle use [Respiration, Rhythm And Depth] : normal respiratory rhythm and effort [Abdomen Soft] : soft [Abdomen Tenderness] : non-tender [Costovertebral Angle Tenderness] : no ~M costovertebral angle tenderness [Urinary Bladder Findings] : the bladder was normal on palpation [] : no rash [Normal Station and Gait] : the gait and station were normal for the patient's age [No Focal Deficits] : no focal deficits [Oriented To Time, Place, And Person] : oriented to person, place, and time [Affect] : the affect was normal [Mood] : the mood was normal

## 2025-03-19 NOTE — HISTORY OF PRESENT ILLNESS
[FreeTextEntry1] : Kindly referred by Dr. Cowan for CKD.  * Urinary retention after surgery. Kilpatrick placed. Urinary retention now resolved. * S/P PCI of LAD and diagonal. Also dx with PAF. *  LVEF increased from 25 to 45 after PCI.  * CKD stable, creatinine 1.88, eGFR 37 by CKD-EPIcr. 64 mg albuminuria.   Previous history (13Nov24): * Events reviewed. S/P PCI of LAD and diagonal. Also dx with PAF. *  LVEF increased from 25 to 45 after PCI. * DM controlled. *  BP controlled.  - 110s at home. SOB with moderate exertion. No CP. No lightheadedness. Compliant with medications. * CKD stable, creatinine 1.78. eGFR 40. 132 mg albuminuria. K 4.4.   Previous history (19Jun24): * BP controlled.  - 120s at home. No SOB with exertion. No CP. No lightheadedness. Compliant with medications. * CKD previously stable, creatinine 1.62 in 19Mar. 98 mg albuminuria. * Breathing comfortably. Continuing inhalation therapy. * No further syncope.   Previous history (19Mar24): * Events reviewed. ER visits on March 6 and March 12 for respiratory illness with cough. Covid and Flu-. Chest CT c/w known TY. Treated with bronchodilators and azithromycin. Creatinine 2 on March 6 and 1.6 on March 12. On March 12 felt presyncopal and evaluated by EPS and sent home with monitor. No further episodes of presyncope or passing out. Wheezing now improved. * BP controlled.  No SOB with exertion. No CP. No lightheadedness. Compliant with medications.   Previous history (29Nov23): * Cr 1.93, eGFR 36 by CKD-EPIcr. 31 mg albuminuria. HG* BP controlled. /64 at home on average. No hypotension. No lightheadedness. No CP/SOB. Compliant with medications. * HGA1c 6.7. * Feels well on jardiance. * K 4.3. * . Atorvastatin 20 mg daily. Only takes 4x a week.   Previous history (23Aug23): * On Jardiance. Feels well. eGFR 36 ([id number]). 115 mg albuminuria. * DM controlled. HGA1c 7. * BP controlled. /64 at home. No lightheadedness. No CP/SOB. Compliant with medications. On amlodipine 2.5 only. On lisinopril 20/15.   Previous history (20Jul23): * BP controlled. BP 120s at home. No lightheadedness. No CP/SOB.Compliant with medications. * eGFR 41 ([id number]-[id number]). 185 mg albuminuria. * DM controlled, HGA1c 6.9. * Bicarb increased to 1 bid. HCO3 18.   Previous history (23May23): * eGFR 43 (cystatin C 1.6, EKFC eGFRcys, [facility] 2023). * 128 mg albuminuria. * K 5.5. Now following low K diet. *  * HTN controlled. BP 120s at home. No lightheadedness. No CP/SOB. Compliant with medications. * Bicarb increased to 1 bid.   Previous history (29Mar23): Labs reviewed. Creatinine stable at 1.6 - 1.8 since 2021. Creatinine 1.3 - 1.4  in 2016, increased to 1.5 in 2017 and 1.7 in 2018. Most recent creatinine 1.86 (eGFR 38 by CKD-EPI). 1+ protein, no hematuria.   The patient denies exposure to chronic NSAIDs, chronic PPIs, green smoothies, creatine, or herbal supplements. The patient denies a history of kidney stones or pyelonephritis. 3-5x night nocturia. No recent renal ultrasound. They are unaware of proteinuria or hematuria.  OLTX related to hep C 2014. Tretaed with everolimus, tacrolimus 0.5 TIW with levels < 2. LFTs normal.   DM rx with glimperide 1 mg daily. HGA1c 7. DM dx 2005. No known retinopathy. No neuropathy.   * HTN borderline uncontrolled. BP 130s at home. No lightheadedness. No CP/SOB. Compliant with medications.   In 2017 he had a golf ball hit his right kidney with a hematoma (uncertain if retroperitoneal) and dx with HTN after that.   Brother has kidney disease of unclear cause requiring transplant. Also had hep c.   Hep C cured.

## 2025-03-19 NOTE — HISTORY OF PRESENT ILLNESS
[FreeTextEntry1] : 74-year-old male presents with post-op urinary retention. S/p anal wart removal on 12/26 with Dr. Benson. Initally able to urinate ok in PACU but became increasingly more difficult in the evening. Presented to ED and cooper was placed. Started on tamsulosin 0.4mg. Prior to retention, no urinary issues. Has some nocturia but attributes it to high volume water intake during the evening. Is not bothered by the nocturia. Has complicated medical hx with s/p liver transplant, lung resection on immunosuppression. States had catheter after his transplant, but no difficulty urinating after removal.     TOV: - 240 cc sterile water instilled via existing catheter - Catheter removed in its entirety - Patient voided 200 cc  - Final  ml  3/19/25: Here for PVR check after post-op urinary retention. Voiding well with no current complaints. Baseline urinary symptoms for which he is not taking medication.   PVR: 54 cc

## 2025-04-01 NOTE — HISTORY OF PRESENT ILLNESS
[FreeTextEntry1] : 75 y/o M with PMH of CAD, HTN, HLD, DMII, TY, A. fib (on Xeralto), Hep C, Primary liver cancer, BCC, PSHx of Liver transplant (2014), and Lung resection is s/p Exam under anesthesia, excision of anal lesions, question condylomata and excision of anal papilla x2 on 12/26/24. Pathology showed Low-grade squamous intraepithelial lesion (condyloma acuminata). He is here for follow up visit.   Last colonoscopy was in 2023 and had no polyps.

## 2025-04-01 NOTE — HISTORY OF PRESENT ILLNESS
[FreeTextEntry1] : 73 y/o M with PMH of CAD, HTN, HLD, DMII, TY, A. fib (on Xeralto), Hep C, Primary liver cancer, BCC, PSHx of Liver transplant (2014), and Lung resection is s/p Exam under anesthesia, excision of anal lesions, question condylomata and excision of anal papilla x2 on 12/26/24. Pathology showed Low-grade squamous intraepithelial lesion (condyloma acuminata). He is here for follow up visit.   Last colonoscopy was in 2023 and had no polyps.

## 2025-04-16 NOTE — HISTORY OF PRESENT ILLNESS
[FreeTextEntry1] : DESIRAE BIANCHI  is a 74 year old  M  History of CAD, CMP, LBBB hepatitis C. Prior liver transplant. TY. Chronic kidney disease. Non-insulin-dependent diabetes. Hypertension and hyperlipidemia.  Chronic dyspnea which relates to his TY Referred by his physicians in [facility]. He spends part time between [facility]. Also spends the kenny in [place].  Cardiac catheterization had significant coronary artery disease. Tammy Díaz with Dr. Vieyra. There was two-vessel disease of the LAD and a large diagonal. Otherwise there were luminal irregularities and a right dominant circulation. IVUS guided PCI of the LAD and diagonal with drug-eluting stents.  7/24/24 was in cardiac rehab noted to have new onset AF. Was sent to ER. EKG confirms AF. He was discharged on xarelto 15mg daily and is off aspirin. Also taking plavix. Reports concern about bleeding risk. His transplant specialist already asked him to stop playing baseball. Followup monitor PAF 4% burden with overall controlled rates.  9/11/24 presented to cardiac rehab with AF with RVR Rate 130s EKG did confirm AF rate 114 bpm. He rested at home and HR normalized. EKG in office SR with known LBBB.  He was last seen in November  interim surgical procedure related to condyloma acuminata  nephrology considering addition of finerenone  recent office notes reviewed. He was active in [place]. Walking playing golf.  There was an episode of atrial fibrillation with urinary retention after his surgical procedure.  He has been monitoring himself with his Apple Watch and the burden of atrial fibrillation has been low.   Echocardiogram has mild LV dysfunction improved at 45%. There is abnormal septal motion related to conduction disease. Mild tricuspid regurgitation. No pulmonary hypertension. Today's EKG is normal sinus rhythm with APC and a left bundle branch block  Last blood work A1c 7.1 albumin creatinine ratio 79 mg per gram hemoglobin 13.6 creatinine 2.0 potassium 4.9 CPK 93 total cholesterol 180    non-smoker. Former  of [place] house. Brother with prior ablation.

## 2025-04-16 NOTE — HISTORY OF PRESENT ILLNESS
[FreeTextEntry1] : DESIRAE BIANCHI  is a 74 year old  M  History of CAD, CMP, LBBB hepatitis C. Prior liver transplant. TY. Chronic kidney disease. Non-insulin-dependent diabetes. Hypertension and hyperlipidemia.  Chronic dyspnea which relates to his TY Referred by his physicians in [facility]. He spends part time between [facility]. Also spends the kenny in [place].  Cardiac catheterization had significant coronary artery disease. Tammy Díaz with Dr. Vieyra. There was two-vessel disease of the LAD and a large diagonal. Otherwise there were luminal irregularities and a right dominant circulation. IVUS guided PCI of the LAD and diagonal with drug-eluting stents.  7/24/24 was in cardiac rehab noted to have new onset AF. Was sent to ER. EKG confirms AF. He was discharged on xarelto 15mg daily and is off aspirin. Also taking plavix. Reports concern about bleeding risk. His transplant specialist already asked him to stop playing baseball. Followup monitor PAF 4% burden with overall controlled rates.  9/11/24 presented to cardiac rehab with AF with RVR Rate 130s EKG did confirm AF rate 114 bpm. He rested at home and HR normalized. EKG in office SR with known LBBB.  He was last seen in November  interim surgical procedure related to condyloma acuminata  nephrology considering addition of finerenone  recent office notes reviewed. He was active in [place]. Walking playing golf.  There was an episode of atrial fibrillation with urinary retention after his surgical procedure.  He has been monitoring himself with his Apple Watch and the burden of atrial fibrillation has been low.   Echocardiogram has mild LV dysfunction improved at 45%. There is abnormal septal motion related to conduction disease. Mild tricuspid regurgitation. No pulmonary hypertension. Today's EKG is normal sinus rhythm with APC and a left bundle branch block  Last blood work A1c 7.1 albumin creatinine ratio 79 mg per gram hemoglobin 13.6 creatinine 2.0 potassium 4.9 CPK 93 total cholesterol 180    non-smoker. Former  of [place] house. Brother with prior ablation.

## 2025-04-16 NOTE — HISTORY OF PRESENT ILLNESS
[FreeTextEntry1] : DESIRAE BIANCHI  is a 74 year old  M  History of CAD, CMP, LBBB hepatitis C. Prior liver transplant. TY. Chronic kidney disease. Non-insulin-dependent diabetes. Hypertension and hyperlipidemia.  Chronic dyspnea which relates to his TY Referred by his physicians in [facility]. He spends part time between [facility]. Also spends the kenny in [place].  Cardiac catheterization had significant coronary artery disease. Tammy Díaz with Dr. Vieyra. There was two-vessel disease of the LAD and a large diagonal. Otherwise there were luminal irregularities and a right dominant circulation. IVUS guided PCI of the LAD and diagonal with drug-eluting stents.  7/24/24 was in cardiac rehab noted to have new onset AF. Was sent to ER. EKG confirms AF. He was discharged on xarelto 15mg daily and is off aspirin. Also taking plavix. Reports concern about bleeding risk. His transplant specialist already asked him to stop playing baseball. Followup monitor PAF 4% burden with overall controlled rates.  9/11/24 presented to cardiac rehab with AF with RVR Rate 130s EKG did confirm AF rate 114 bpm. He rested at home and HR normalized. EKG in office SR with known LBBB.  He was last seen in November  interim surgical procedure related to condyloma acuminata  nephrology considering addition of finerenone  recent office notes reviewed. He was active in [place]. Walking playing golf.  There was an episode of atrial fibrillation with urinary retention after his surgical procedure.  He has been monitoring himself with his Apple Watch and the burden of atrial fibrillation has been low.   Echocardiogram has mild LV dysfunction improved at 45%. There is abnormal septal motion related to conduction disease. Mild tricuspid regurgitation. No pulmonary hypertension. Today's EKG is normal sinus rhythm with APC and a left bundle branch block  Last blood work A1c 7.1 albumin creatinine ratio 79 mg per gram hemoglobin 13.6 creatinine 2.0 potassium 4.9 CPK 93 total cholesterol 180    non-smoker. Former  of [place] house. Brother with prior ablation.

## 2025-04-16 NOTE — ASSESSMENT
[FreeTextEntry1] :  reviewed long-term LDL goal and threshold.  There was prior intolerance to high intensity statin therapy.  Prior intolerance to Zetia.  Discussed addition of PCSK9 monoclonal antibody.  Follow-up blood work.  Follow-up after requested echocardiogram and stress test.  Due to left bundle branch block pharmacologic vasodilator study will be performed blood pressure and hyperkalemia may limit the use of finerenone  cholesterol remains elevated above goal  follow-up with other specialist as scheduled including nephrology and endocrinology transplant etc.  CMP S/P PCI 7/2024 There is improvement in left ventricular function from prior echocardiogram. Prior echocardiogram has an ejection fraction of 30% now is 45% bbl SGLT2i ACEi ?mra  PAF RVR BBl and a/c Educated patient on pathophysiology of atrial fibrillation and natural progression as well as associated risk of cardioembolic event. Informed him on indications for long term anticoagulation. Reviewed bleeding precautions. Declines ablation, ILR, watchman. Apple watch monitoring. Reinvolve EP for recurrent PAF.  history of diabetes, chronic kidney disease hypertension and hyperlipidemia. Instructed to notify our office if any new symptoms. ACE inhibitor and SGLT2 inhibitor. Renal function may limit use of MRA. Long-term would prefer not to take sulfonylurea. followup endo  Moderate mitral regurgitation. Carotid, aortic atherosclerosis. CKD Monitor valvular heart disease LV size and function.  Any questions and concerns were addressed and resolved.

## 2025-04-22 NOTE — HEALTH RISK ASSESSMENT
[None] : Patient does not have any barriers to medication adherence [Yes] : Reviewed medication list for presence of high-risk medications. [Blood Thinners] : blood thinners [Never] : Never [Patient reported colonoscopy was normal] : Patient reported colonoscopy was normal [] :  [Fully functional (bathing, dressing, toileting, transferring, walking, feeding)] : Fully functional (bathing, dressing, toileting, transferring, walking, feeding) [Fully functional (using the telephone, shopping, preparing meals, housekeeping, doing laundry, using] : Fully functional and needs no help or supervision to perform IADLs (using the telephone, shopping, preparing meals, housekeeping, doing laundry, using transportation, managing medications and managing finances) [Reports changes in hearing] : Reports no changes in hearing [Reports changes in vision] : Reports no changes in vision [Reports changes in dental health] : Reports no changes in dental health

## 2025-04-22 NOTE — ADDENDUM
[FreeTextEntry1] : This time included review of previous records,  lab. data, discussing findings, differential diagnosis, glycemic, weight, BP and lipid goals; complications of diabetes; weight management - diet, exercise; prevention and management of hypoglycemia; further testing and evaluation, therapeutic options, renewing medications, completing the record.

## 2025-04-22 NOTE — ASSESSMENT
[FreeTextEntry1] : Type 2 DM S/P liver transplant Hyperlipidemia CAD  Will continue current management Medications refilled F/U - 6 months

## 2025-04-22 NOTE — PHYSICAL EXAM
[No Acute Distress] : no acute distress [Well-Appearing] : well-appearing [Normal Sclera/Conjunctiva] : normal sclera/conjunctiva [Normal] : affect was normal and insight and judgment were intact [de-identified] : exostosis  [de-identified] : 1+ b/l LE pitting edema

## 2025-04-22 NOTE — HISTORY OF PRESENT ILLNESS
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 Diabetes developed in 2005 while he was receiving interferon therapy for Hep C. he is not aware of having any complications of diabetes (last eye exam 8/2016). he is on glimepiride 4 mg/day. HbA1C today - 6.5%, glucose - 121 mg/dl. He reports no hypoglycemia. 12/7/17. The patient is feeling well and has no complaints. HbA1C today is 6%, glucose - 119 mg/dl. His current dose of prednisone is 4 mg/day. His most recent eye exam was a few weeks ago - reportedly, without retinopathy. 9/25/18. The patient is doing very well on glimepiride 2 mg/day. HbA1C on 7/6/18 was 6.1%, on 9/13/18 - 6.3%. Glucose today is 225 mg/dl. He is now on 3 mg of prednisone daily. His next ophthalm. exam is in 2 days. He reports no hypoglycemia. 4/4/19. The patient is doing well. he continues on glimepiride 2 mg/day. He reports no hypoglycemia; has lost 8 lb. HbA1C today is 6.6%, glucose - 174 mg/dl. 10/3/19. The patient is doing very well. He has recently developed floaters and flashes in the Rt eye - is seeing ophthalmologist today. HbA1C was 6.8% on 9/4/19; glucose today is 174 mg/dl. His weight is stable. He reports no hypoglycemia. 7/15/2020. The patient is feeling well. His diabetes is in good control, with average BSs around 105 mg/dl. He is on glimepiride 2 mg/day. He reports no hypoglycemia. HbA1C was 6.6% on 6/4/2020. He has lost 6 lb. 10/13/21. The patient is doing well - has  no complaints. His weight is stable. He continues on 1mg glimepiride/day. He reports no hypoglycemia. Next opthalm. exam is scheduled in 2 weeks. Blood tests tomorrow in Dr. Cowan's office. CGM data reviewed. No hypoglycemic or hyperglycemic range. Average for the last 90 days - 116 mg/dl. HbA1C today is 6.2%, glucose - 134 mg/dl. 11/16/22. The patient is doing well. He has lost 7 lb since his previous visit, 16 lb since his first visit here on 7/12/2017. HbA1C today is 6.3%, glucose - 139 mg/dl. BSs at home range 90 - 130 mg/dl. He reports no hypoglycemia. 11/8/23. The patient is doing well. He has no complaints. His weight is stable. he reports no hypoglycemia. Glucose today is 169 mg/dl. HbA1C was 6.8% on 9/28/23. He is on glimepiride 1 mg/day and Jardiance 5 mg/day. 10/16/24. The patient is doing well but he had two coronary stents implanted on 6/27/24. HbA1C today is 6.9%, glucose - 180 mg/dl. He reports no hypoglycemia. His weight is stable. 4/22/25. The patient is doing well - he has no complaints. His weight is stable. He is now on Repatha for hyperlipidemia. HbA1C was 7.1% on 4/3/25. Glucose today is 99 mg/dl.

## 2025-04-22 NOTE — HISTORY OF PRESENT ILLNESS
[FreeTextEntry1] : annual physical [de-identified] : Pt is a 73 y/o M with PMHx of Hep C s/p liver transplant, PAF, CAD s/p PCI / stent (LAD) 6/2024, TY, who presents to the office today for an annual physical.  Pt reports he is currently feeling well  Pt had issues with post-op urinary retention s/p anal wart removal on 12/26/24.  Had f/u with uro and now resolved. Now on Repatha. Took one dose thus far. Had a slight increase in glucose. Will be seeing endo later today. Yesterday had CT chest lung for hx of TY - will see pulm tomorrow Saline nebs for TY infection  Lives in [place] 3 mo of the year   HCM - Covid vaccine:  UTD  - PNA vaccine: needs [id number] - Influenza vaccine:  UTD - Shingrix vaccine: UTD - Tdap vaccine:  Will receive today - RSV vaccine:  UTD - Colonoscopy: 2023 - PSA: check today - Ophthalmology: UTD - Dentist: UTD - Derm: UTD - requesting referral - Diet: wife cooks, reports meals are well balanced  - Exercise:  exercising regularly

## 2025-04-23 NOTE — ASSESSMENT
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 Etiology: idiopathic Workup: done at [facility] in 2013. CFTR mut neg (97 mutations), IgG normal, IgE normal Associated conditions: NTM LD, HCV, immunosuppression for liver tx (Carolin and NTMLD preceded the transplant) Microbial colonization: NTM Symptoms: minimal - mild cough; mild dyspnea Exacerbations in the past 2 years: 2 Hospitalizations for bronchiectasis exacerbation in the last 2 years: 1 BMI: 21.7 mMRC score: 1 PFT: FEV1 57% predicted (October 2, 2024) Radiologic severity/number of lobes involved: cyclindrical; RUL, RLL, lingula BSI: 13 (severe) THERAPIES: -Airway clearance regimen: 7% saline once a day (albuterol causes tachycardia) used Aerobika in the past but no longer - hypersal sufficient -Antimicrobial therapy: h/o TY rx, including with inhaled amikacin - none since 2014 - Anti-inflammatory: none Vaccinations: up to date on flu, covid, rsv and pneumonia   # NTM LD: TY and M abscessus in the past; sp rx for TY including inhaled amikacin; none since 2014; on hypersal only; doing well despite immunosuppression post liver tx; CT chest without much change since 2016. No cavitary disease. M. abscessus in sputum cx from 4.1.24. Additional sputum cx pending  RECOMMENDATIONS: --continue hypersal --repeat sputum cx --repeat CT in March 2025 --FU March 2025

## 2025-04-23 NOTE — HISTORY OF PRESENT ILLNESS
[TextBox_4] :   72 yo M hx of CKD stage III, chronic calcineurin inh toxicity, ?DM nephropathy, HTN, liver transplant on everolimus/tacrolimus + bactrim ppx -- who was referred for evaluation and management of bronchiectasis and NTM LD  Began having cough in the early 2000's and was initially treated as recurrent bronchitis 2/2 frequent air travel. Treated with abx. Eventually it got so bad he needed a CXR/CT which was very abnomral. Saw Dr. Rahman (Pulm) and Dr. Garsia (ID). 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 B/w Jacki Rahman and Sun , he was treated with 3 drugs for TY. Persistent infection. Inhaled Amikacyn.   Developed need for liver transplant (HCV) while monitored by Dr. Mckeon (hepatology)  Around June of 2013 had to be taken off liver transplant list because of TY. Went to [facility] for evaluation (also started on hypertonic saline prior to eval). Was at [facility] for 11 days, Dr. Amaya, recommended 4 abx regimen, but Dr. Garsia wanted to evaluate w/ just airway clearance and opted not to do regimen. Was able to get back on list and transplanted 3/2014 with no abx. A year after transplant, the JAY worsened and had hemoptysis requiring resection. Rehospitalized w/ staph but otherwise for the last 10 yrs he has been living his life normally. Gets "colds" and gets abx - usually azithromycin. Does hypertonic saline once day every day, will do twice daily if feels sick.  Exercises every day w/ elliptical and weights, walks unlimited (5-6 miles/day).  Recent bronchitis after a trip to [place]. Wheezing. Short of breath. Seen in ED. RVP neg. Given albuterol ---> heart racing (his usual side effect). Discharged. Returned. CT chest w/ tree in bud, bronchiectasis. Rx with levaalbuterol, z-pack, ? steroids.  Current regimen: 7% saline once a day (twice if sick) no phlegm  occasional streaks of blood exercises daily; walked 3 miles yesterday been wheezing "a little more", more heavy breathing  shots: covid. rsv, pneumococcal  10/2/24 2 cardiac stents placed EF from 25 to 45% breathing improved dogin cardiac rehab minimal cuiogh 7% saline in AM --> minimal sputum

## 2025-04-23 NOTE — RESULTS/DATA
[TextEntry] : RADIOLOGY  3/12/24 FINDINGS:  LUNGS AND AIRWAYS: There is diffuse mild bronchial wall thickening with scattered areas of bronchiectasis. There are scattered bilateral areas of tree-in-bud micronodules and patchy airspace consolidations, particularly in the bilateral lower lobes. PLEURA: There is pleural thickening along the left lung base. There are a couple pleural calcifications along the medial left upper lobe MEDIASTINUM AND DEVIN: No lymphadenopathy. VESSELS: Within normal limits. Mild coronary artery calcifications HEART: Heart size is normal. No pericardial effusion. CHEST WALL AND LOWER NECK: Within normal limits. VISUALIZED UPPER ABDOMEN: Within normal limits. BONES: Within normal limits.  IMPRESSION: Diffuse bronchial wall thickening with scattered areas of bronchiectasis, tree-in-bud nodules, and patchy airspace consolidations is compatible with known TY.   PFT 10/2/24 Prebronchodilator FVC 4.06 L, 88% predicted Prebronchodilator FEV1 1.92 L, 57% predicted Prebronchodilator FEV1/FVC 47% Postbronchodilator FVC 4.16 L, 90% predicted Postbronchodilator FEV1 2.04 L, 61% predicted Postbronchodilator FEV1/FVC 49% TLC_N25.60 3 L, 75% predicted DLCO 13 units, 46% predicted Impression: Moderate obstructive ventilatory deficit moderate diffusion impairment.  Compared with the previous study on 4/1/2024, there is a 20% increase in forced vital capacity, 23% increase in FEV1, 13% increase in total lung capacity and 8% increase in diffusing capacity    4/12/24 fvc: 2.54L, 53%p fev1: 1.23L, 33%p fev1/fvc: 64%   EKG / ECHO      MICRO Mycobacterial 10/15/24: negative 10/2/24: MAC 04/01/24: M abscessus  ***FINAL REPORT*** Final Report  [] Reported Date/Time: 10/19/2024 15:21 EDT Rare Pseudomonas aeruginosa Commensal keon consistent with body site  ***STAIN REPORT*** Gram Stain  []  Reported Date/Time: 10/17/2024 14:46 EDT Few polymorphonuclear leukocytes seen per low power field Few Squamous epithelial cells seen per low power field Numerous Gram Negative Rods seen per oil power field Moderate Gram Positive Cocci in Pairs and Chains seen per oil power field Rare Gram Positive Rods seen per oil power field  ***SUSCEPTIBILITY RESULTS***                   Pseaer Antibiotic        MDIL          MINT Aztreonam         16            Intermediate Cefepime          8             Susceptible Ceftazidime       4             Susceptible Ciprofloxacin     <=0.25        Susceptible Imipenem          2             Susceptible Levofloxacin      <=0.5         Susceptible Meropenem         <=1           Susceptible Piperacillin/     16            Susceptible Tazobactam  Start Date/Time:          4/2/2024 08:39 EDT       Free Text Source:  ***PRELIMINARY REPORT*** Preliminary Report  [] Reported Date/Time: 4/9/2024 18:00 EDT Growth of acid fast bacilli detected in broth media. Identification will be performed when biomass is adequate for testing.  ***STAIN REPORT*** Acid Fast Stain  []  Reported Date/Time: 4/2/2024 19:21 EDT No acid-fast bacilli seen by fluorochrome stain     [facility]    OTHER LABS OF NOTE

## 2025-04-23 NOTE — PHYSICAL EXAM
[TextEntry] : Gen: Pleasant fit appearing man in no distress HEENT: Sclera non-icteric, conjunctiva non-injected; oropharynx clear w/o thrush;  Neck: supple w/o cervical LAD; no bruits CV: Regular rate and rhythm, no murmurs, rubs or gallops Lungs: right lower lobe coarse breath sounds, otherwise clear Extremities: no clubbing, cyanosis or edema Skin: no rash Psych: normal mood and affect Neuro:  non-focal

## 2025-04-23 NOTE — HISTORY OF PRESENT ILLNESS
[TextBox_4] :   72 yo M hx of CKD stage III, chronic calcineurin inh toxicity, ?DM nephropathy, HTN, liver transplant on everolimus/tacrolimus + bactrim ppx -- who was referred for evaluation and management of bronchiectasis and NTM LD  Began having cough in the early 2000's and was initially treated as recurrent bronchitis 2/2 frequent air travel. Treated with abx. Eventually it got so bad he needed a CXR/CT which was very abnomral. Saw Dr. Rahman (Pulm) and Dr. Garsia (ID). 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 B/w Jacki Rahman and Sun , he was treated with 3 drugs for TY. Persistent infection. Inhaled Amikacyn.   Developed need for liver transplant (HCV) while monitored by Dr. Mckeon (hepatology)  Around June of 2013 had to be taken off liver transplant list because of TY. Went to [facility] for evaluation (also started on hypertonic saline prior to eval). Was at [facility] for 11 days, Dr. Amaya, recommended 4 abx regimen, but Dr. Garsia wanted to evaluate w/ just airway clearance and opted not to do regimen. Was able to get back on list and transplanted 3/2014 with no abx. A year after transplant, the JAY worsened and had hemoptysis requiring resection. Rehospitalized w/ staph but otherwise for the last 10 yrs he has been living his life normally. Gets "colds" and gets abx - usually azithromycin. Does hypertonic saline once day every day, will do twice daily if feels sick.  Exercises every day w/ elliptical and weights, walks unlimited (5-6 miles/day).  Recent bronchitis after a trip to [place]. Wheezing. Short of breath. Seen in ED. RVP neg. Given albuterol ---> heart racing (his usual side effect). Discharged. Returned. CT chest w/ tree in bud, bronchiectasis. Rx with levaalbuterol, z-pack, ? steroids.  Current regimen: 7% saline once a day (twice if sick) no phlegm  occasional streaks of blood exercises daily; walked 3 miles yesterday been wheezing "a little more", more heavy breathing  shots: covid. rsv, pneumococcal  10/2/24 2 cardiac stents placed EF from 25 to 45% breathing improved dogin cardiac rehab minimal cuiogh 7% saline in AM --> minimal sputum

## 2025-04-23 NOTE — ASSESSMENT
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 Etiology: idiopathic Workup: done at [facility] in 2013. CFTR mut neg (97 mutations), IgG normal, IgE normal Associated conditions: NTM LD, HCV, immunosuppression for liver tx (Carolin and NTMLD preceded the transplant) Microbial colonization: NTM Symptoms: minimal - mild cough; mild dyspnea Exacerbations in the past 2 years: 2 Hospitalizations for bronchiectasis exacerbation in the last 2 years: 1 BMI: 21.7 mMRC score: 1 PFT: FEV1 57% predicted (October 2, 2024) Radiologic severity/number of lobes involved: cyclindrical; RUL, RLL, lingula BSI: 13 (severe) THERAPIES: -Airway clearance regimen: 7% saline once a day (albuterol causes tachycardia) used Aerobika in the past but no longer - hypersal sufficient -Antimicrobial therapy: h/o TY rx, including with inhaled amikacin - none since 2014 - Anti-inflammatory: none Vaccinations: up to date on flu, covid, rsv and pneumonia   # NTM LD: TY and M abscessus in the past; sp rx for TY including inhaled amikacin; none since 2014; on hypersal only; doing well despite immunosuppression post liver tx; CT chest without much change since 2016. No cavitary disease. M. abscessus in sputum cx from 4.1.24. Additional sputum cx pending  RECOMMENDATIONS: --continue hypersal --repeat sputum cx --repeat CT in March 2025 --FU March 2025

## 2025-05-14 NOTE — HISTORY OF PRESENT ILLNESS
[FreeTextEntry1] : DESIRAE BIANCHI  is a 74 year old  M  History of CAD, CMP, LBBB hepatitis C. Prior liver transplant. TY. Chronic kidney disease. Non-insulin-dependent diabetes. Hypertension and hyperlipidemia.  Chronic dyspnea which relates to his TY Referred by his physicians in [facility]. He spends part time between [facility]. Also spends the kenny in [place].  Cardiac catheterization had significant coronary artery disease. Tammy Díaz with Dr. Vieyra. There was two-vessel disease of the LAD and a large diagonal. Otherwise there were luminal irregularities and a right dominant circulation. IVUS guided PCI of the LAD and diagonal with drug-eluting stents.  7/24/24 was in cardiac rehab noted to have new onset AF. Was sent to ER. EKG confirms AF. He was discharged on xarelto 15mg daily and is off aspirin. Also taking plavix. Reports concern about bleeding risk. His transplant specialist already asked him to stop playing baseball. Followup monitor PAF 4% burden with overall controlled rates.  9/11/24 presented to cardiac rehab with AF with RVR Rate 130s EKG did confirm AF rate 114 bpm. He rested at home and HR normalized. EKG in office SR with known LBBB.  nephrology considering addition of finerenone  recent office notes reviewed. He was active in [place]. Walking playing golf.  There was an episode of atrial fibrillation with urinary retention after his surgical procedure. He has been monitoring himself with his Apple Watch and the burden of atrial fibrillation has been low ~2% States he is very active in the yard every day without symptoms.   He wishes to minimize his blood thinners but denies any bleeding issues.  He is now on Repatha and tolerating well.   Echo 5/6/25 EF ~40%, mild LVH, GLS -18% pattern c/w possible amyloid, abnl apical wall motion, mild to mod MR Nuclear stress test 5/6/25 post stress EF 43% apical infarct no ischemia  Echocardiogram 9/2024 has mild LV dysfunction improved at 45%. There is abnormal septal motion related to conduction disease. Mild tricuspid regurgitation. No pulmonary hypertension. EKG is normal sinus rhythm with APC and a left bundle branch block   Lab k 5.6, cr 1.8 Last blood work A1c 7.1 albumin creatinine ratio 79 mg per gram hemoglobin 13.6 creatinine 2.0 potassium 4.9 CPK 93 total cholesterol 180    non-smoker. Former  of [place] house. Brother with prior ablation.

## 2025-05-14 NOTE — ASSESSMENT
[FreeTextEntry1] : DESIRAE BIANCHI is a 74 year old M who presents today May 12, 2025 with the above history and the following active issues:   CMP CAD S/P PCI 6/27/2024 There is improvement in left ventricular function from prior echocardiogram. Prior to PCI 30% now ~40s by MPI and TTE  There is apical wall motion abnormality and fixed defect No ischemic heart disease No angina Strain pattern c/w ? amyloid Referred to advanced HF specialist to further evaluate r/o amyloid bbl SGLT2i ACEi no MRA d/t hyperkalemia, repeat is planned Will discuss antiplatelet with his cardiologist Dr. Wilkes  PAF RVR BBl and a/c Educated patient on pathophysiology of atrial fibrillation and natural progression as well as associated risk of cardioembolic event. Informed him on indications for long term anticoagulation. Reviewed bleeding precautions. Declines ablation, ILR, watchman. Apple watch monitoring. Reinvolve EP for recurrent sustained AF. Revisit watchman if unable to take AC.   history of diabetes, chronic kidney disease hypertension and hyperlipidemia. Instructed to notify our office if any new symptoms. ACE inhibitor and SGLT2 inhibitor. Renal function may limit use of MRA. Long-term would prefer not to take sulfonylurea. followup endo  Moderate mitral regurgitation. Carotid, aortic atherosclerosis. CKD Monitor valvular heart disease LV size and function.  follow-up with other specialist as scheduled including nephrology and endocrinology transplant etc. Any questions and concerns were addressed and resolved.  Sincerely,   TRE Cruz Patients history, testing, medications, and any relative changes to plan of care reviewed with supervising MD: Dr. Abhinav Scruggs

## 2025-05-14 NOTE — HISTORY OF PRESENT ILLNESS
[FreeTextEntry1] : DESIRAE BIANCHI  is a 74 year old  M  History of CAD, CMP, LBBB hepatitis C. Prior liver transplant. TY. Chronic kidney disease. Non-insulin-dependent diabetes. Hypertension and hyperlipidemia.  Chronic dyspnea which relates to his TY Referred by his physicians in [facility]. He spends part time between [facility]. Also spends the kenny in [place].  Cardiac catheterization had significant coronary artery disease. Tammy Díaz with Dr. Vieyra. There was two-vessel disease of the LAD and a large diagonal. Otherwise there were luminal irregularities and a right dominant circulation. IVUS guided PCI of the LAD and diagonal with drug-eluting stents.  7/24/24 was in cardiac rehab noted to have new onset AF. Was sent to ER. EKG confirms AF. He was discharged on xarelto 15mg daily and is off aspirin. Also taking plavix. Reports concern about bleeding risk. His transplant specialist already asked him to stop playing baseball. Followup monitor PAF 4% burden with overall controlled rates.  9/11/24 presented to cardiac rehab with AF with RVR Rate 130s EKG did confirm AF rate 114 bpm. He rested at home and HR normalized. EKG in office SR with known LBBB.  nephrology considering addition of finerenone  recent office notes reviewed. He was active in [place]. Walking playing golf.  There was an episode of atrial fibrillation with urinary retention after his surgical procedure. He has been monitoring himself with his Apple Watch and the burden of atrial fibrillation has been low ~2% States he is very active in the yard every day without symptoms.   He wishes to minimize his blood thinners but denies any bleeding issues.  He is now on Repatha and tolerating well.   Echo 5/6/25 EF ~40%, mild LVH, GLS -18% pattern c/w possible amyloid, abnl apical wall motion, mild to mod MR Nuclear stress test 5/6/25 post stress EF 43% apical infarct no ischemia  Echocardiogram 9/2024 has mild LV dysfunction improved at 45%. There is abnormal septal motion related to conduction disease. Mild tricuspid regurgitation. No pulmonary hypertension. EKG is normal sinus rhythm with APC and a left bundle branch block   Lab k 5.6, cr 1.8 Last blood work A1c 7.1 albumin creatinine ratio 79 mg per gram hemoglobin 13.6 creatinine 2.0 potassium 4.9 CPK 93 total cholesterol 180    non-smoker. Former  of [place] house. Brother with prior ablation.

## 2025-06-18 NOTE — PHYSICAL EXAM
[General Appearance - In No Acute Distress] : in no acute distress [General Appearance - Alert] : alert [Neck Appearance] : the appearance of the neck was normal [Neck Cervical Mass (___cm)] : no neck mass was observed [Jugular Venous Distention Increased] : there was no jugular-venous distention [Thyroid Diffuse Enlargement] : the thyroid was not enlarged [Thyroid Nodule] : there were no palpable thyroid nodules [Heart Rate And Rhythm] : heart rate was normal and rhythm regular [Heart Sounds] : normal S1 and S2 [Heart Sounds Gallop] : no gallops [Murmurs] : no murmurs [Heart Sounds Pericardial Friction Rub] : no pericardial rub [Bowel Sounds] : normal bowel sounds [Abdomen Soft] : soft [Abdomen Tenderness] : non-tender [] : no hepato-splenomegaly [Abdomen Mass (___ Cm)] : no abdominal mass palpated [Cervical Lymph Nodes Enlarged Posterior Bilaterally] : posterior cervical [Cervical Lymph Nodes Enlarged Anterior Bilaterally] : anterior cervical [Supraclavicular Lymph Nodes Enlarged Bilaterally] : supraclavicular [FreeTextEntry1] : pitting ankles

## 2025-06-18 NOTE — HISTORY OF PRESENT ILLNESS
[FreeTextEntry1] : Kindly referred by Dr. Cowan for CKD.  * Creatinine 1.97, GFR 35 by CKD-EPIcr. HGA1c 7.8. 79 mg albuminuria. * Increased HGA1c and occasional leg cramps on lipitor. LDL now down to 23 on repatha. Per Dr. Cowan, will stop atorvastatin and follow LDL. *  K 4.8.   Previous history (19Mar25): * Urinary retention after surgery. Kilpatrick placed. Urinary retention now resolved. * S/P PCI of LAD and diagonal. Also dx with PAF. *  LVEF increased from 25 to 45 after PCI.  * CKD stable, creatinine 1.88, eGFR 37 by CKD-EPIcr. 64 mg albuminuria.   Previous history (13Nov24): * Events reviewed. S/P PCI of LAD and diagonal. Also dx with PAF. *  LVEF increased from 25 to 45 after PCI. * DM controlled. *  BP controlled.  - 110s at home. SOB with moderate exertion. No CP. No lightheadedness. Compliant with medications. * CKD stable, creatinine 1.78. eGFR 40. 132 mg albuminuria. K 4.4.   Previous history (19Jun24): * BP controlled.  - 120s at home. No SOB with exertion. No CP. No lightheadedness. Compliant with medications. * CKD previously stable, creatinine 1.62 in 19Mar. 98 mg albuminuria. * Breathing comfortably. Continuing inhalation therapy. * No further syncope.   Previous history (19Mar24): * Events reviewed. ER visits on March 6 and March 12 for respiratory illness with cough. Covid and Flu-. Chest CT c/w known TY. Treated with bronchodilators and azithromycin. Creatinine 2 on March 6 and 1.6 on March 12. On March 12 felt presyncopal and evaluated by EPS and sent home with monitor. No further episodes of presyncope or passing out. Wheezing now improved. * BP controlled.  No SOB with exertion. No CP. No lightheadedness. Compliant with medications.   Previous history (29Nov23): * Cr 1.93, eGFR 36 by CKD-EPIcr. 31 mg albuminuria. HG* BP controlled. /64 at home on average. No hypotension. No lightheadedness. No CP/SOB. Compliant with medications. * HGA1c 6.7. * Feels well on jardiance. * K 4.3. * . Atorvastatin 20 mg daily. Only takes 4x a week.   Previous history (23Aug23): * On Jardiance. Feels well. eGFR 36 ([id number]). 115 mg albuminuria. * DM controlled. HGA1c 7. * BP controlled. /64 at home. No lightheadedness. No CP/SOB. Compliant with medications. On amlodipine 2.5 only. On lisinopril 20/15.   Previous history (20Jul23): * BP controlled. BP 120s at home. No lightheadedness. No CP/SOB.Compliant with medications. * eGFR 41 ([id number]). 185 mg albuminuria. * DM controlled, HGA1c 6.9. * Bicarb increased to 1 bid. HCO3 18.   Previous history (23May23): * eGFR 43 (cystatin C 1.6, EKFC eGFRcys, [facility] 2023). * 128 mg albuminuria. * K 5.5. Now following low K diet. *  * HTN controlled. BP 120s at home. No lightheadedness. No CP/SOB. Compliant with medications. * Bicarb increased to 1 bid.   Previous history (29Mar23): Labs reviewed. Creatinine stable at 1.6 - 1.8 since 2021. Creatinine 1.3 - 1.4  in 2016, increased to 1.5 in 2017 and 1.7 in 2018. Most recent creatinine 1.86 (eGFR 38 by CKD-EPI). 1+ protein, no hematuria.   The patient denies exposure to chronic NSAIDs, chronic PPIs, green smoothies, creatine, or herbal supplements. The patient denies a history of kidney stones or pyelonephritis. 3-5x night nocturia. No recent renal ultrasound. They are unaware of proteinuria or hematuria.  OLTX related to hep C 2014. Tretaed with everolimus, tacrolimus 0.5 TIW with levels < 2. LFTs normal.   DM rx with glimperide 1 mg daily. HGA1c 7. DM dx 2005. No known retinopathy. No neuropathy.   * HTN borderline uncontrolled. BP 130s at home. No lightheadedness. No CP/SOB. Compliant with medications.   In 2017 he had a golf ball hit his right kidney with a hematoma (uncertain if retroperitoneal) and dx with HTN after that.   Brother has kidney disease of unclear cause requiring transplant. Also had hep c.   Hep C cured.

## 2025-06-18 NOTE — ASSESSMENT
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 * Recheck U/A.  * Will consider adding kerendia. Will hold for now given relatively lower BP. Will consider starting 5 mg daily (1/2 the usual dose). Will consider when he returns from [place] in March 2025. Also previous hyperkalemia.  * May benefit from GLP1 agonist. Defer to Dr. Garcia. * Therapies for kidney disease: blood pressure control; DM control; proteinuria reduction with ARB/ACEi; SGLT2i; other evidence-based therapies recommended including exercise, a plant-based lower oxalate diet, and 400 mcg folic acid daily * Cardiovascular disease prevention: counseling on healthy diet, physical activity, weight loss, alcohol limitation, blood pressure control; cholesterol therapy; cardiology evaluation/followup advised * A counseling information sheet on CKD has been given (which they have been instructed to read). * The patient has been counseled that chronic kidney disease is a significant condition and regular office follow-up with me (at least every 3-4 months for now) is important for monitoring and their health, and that it is their responsibility to make a follow-up appointment. * The patient has been counseled never to stop taking their medications without discussing it with me or another doctor. * The patient has been counseled on avoiding NSAIDs. * The patient has been counseled on risk of worsening kidney function and instructed to immediately call and speak with me and go immediately to ER with any severe symptoms, nausea, vomiting, diarrhea, chest pain, or shortness of breath.  # HTN controlled. * Cont lisinopril 20/20 for proteinuria/elevated BP in office. Given controlled BP, will avoid kerendia currently. * The patient's blood pressure was checked with the [employer] [id number] using the SPRINT trial protocol after sitting quietly in an empty room with arm supported, back supported, and feet on the floor for 5 minutes. The average of 3 readings were taken. * A counseling information sheet on blood pressure and staying healthy has been given (which they have been instructed to read). * The patient has been counseled to check their BP at home with an automatic arm cuff, write down the readings, and reach me directly on the phone immediately if they are persistently > 180 systolic or if SBP is less than 100 or if lightheadedness develops. They were counseled to bring in all blood pressure readings and medications next visit. * The patient has been counseled that regular office follow-up (at least every 3-4 months for now) is important for monitoring and for their health, and that it is their responsibility to make follow up appointments. * The patient also has been counseled that they must never stop or change any medications without discussing this with me (or another physician).   # Hyperchol. * Stop lipitor. Cont repatha. D/W Dr. Multani.   # Respiratory infection. Hx of TY. * Pulm followup.  # DM. * Follow HGA1c.

## 2025-06-26 NOTE — PHYSICAL EXAM
[Well Nourished] : well nourished [No Acute Distress] : no acute distress [Normal] : soft, non-tender, no masses/organomegaly, normal bowel sounds [Normal Gait] : normal gait [No Edema] : no edema [Moves all extremities] : moves all extremities [Alert and Oriented] : alert and oriented [de-identified] : JVP at clavicle [de-identified] : warm

## 2025-06-26 NOTE — DISCUSSION/SUMMARY
[FreeTextEntry1] : # R/o cardiac amyloid - Echocardiogram raise concern for possible amyloid strain pattern.  Although this can be nonspecific, patient does have an LBBB that could be the result of infiltrative cardiomyopathy versus ischemic.  He does not have any other outwardly signs of neurologic impairment: No carpal tunnel,  neuropathy or spinal stenosis. - Will obtain PYP amyloid scan and AL amyloid labs.  # HFrEF   - ETIOLOGY: likely ischemic CM however, see amyloid rule out as above. - LVEF plateaued around 45%.   - GDMT: - Currently on metoprolol succinate 75 mg XL daily, lisinopril 20 mg daily, and Jardiance 10 mg daily. -I do feel the patient would benefit from changing lisinopril to Entresto.  Will reach out to his transplant, nephrology and primary cardiology teams to ensure we are comfortable with the switch.  He would need to recheck blood test in 1 to 2 weeks post change.   - DIURETICS: -Euvolemic on exam.  Continue off of standing diuretics.   - LABS: - 6/13/2025: K4.8, creatinine 1.79, EGFR 35, normal liver enzymes. -Lipid panel: TC 93, TG 87, HDL 53, LDL 23.   - DEVICE: none in place, LVEF > 35%. He has a LBBB so would qualify for CRT-D if EF drops.   - EDUCATION: HF education provided including lifestyle changes (low Na diet, fluid restriction, increase physical activity), current clinical condition, natural progression and prognosis.  #PAF -EKG from 4/7/2025 reviewed.  Likely atrial fibrillation present. -HR today 80s.  Continue rate control as above. - AC: On Xarelto 15 mg daily.  F/U with me after above testing.

## 2025-06-26 NOTE — HISTORY OF PRESENT ILLNESS
[FreeTextEntry1] : 76 yo M with CAD s/p PCI to LAD and D1 @ [facility] with Dr. Obi Vieyra, pAF, LBBB, CKD, DM2, hep C cirrhosis s/p liver txp, presents today for initial heart failure evaluation.  Patient developed a new LBBB as seen on ECG from 11/2020.  In June 2024 patient underwent workup for dyspnea.  He was in [place] and was treated with inhalers with limited benefit.  Echocardiogram revealed newly depressed LV function at 25-30% with inferior and apical wall motion abnormalities.  Cardiac catheterization revealed two-vessel CAD and underwent IVUS guided PCI of the LAD and D1.  He was enrolled in cardiac rehab and on 7/24/2024 he was found to have new onset A-fib.  He was started on Xarelto.  Follow-up Holter monitor revealed A-fib burden 4% with occasional RVR up to 130s.  Post revascularization echo with LV EF improvement to 45% where it has plateaued.  Regional strain imaging on most recent echo from 5/6/2025 was concerning for possible cardiac amyloid with preserved strain at the apex.  He is being referred for amyloid workup.  Today, he reports he feels well overall.  He engages in physical activity including playing softball and rowing for at least 20 minutes x 3 times per week.  He denies chest pain, palpitations, dyspnea at rest or exertion, orthopnea, leg swelling, changes in appetite, changes in weight, bendopnea, lightheadedness, dizziness, and syncope/pre-syncope.  He spends part time between [facility] and kenny in [place].

## 2025-06-26 NOTE — CARDIOLOGY SUMMARY
[de-identified] : Nuclear SPECT 5/6/2025: Medium size moderate defect in the apical and inferoseptal walls that is fixed and does not normalize with proning, LVEF 43%. [de-identified] : 5/6/2025: LVEF 41%, LVEDD 5.2 cm, IVSd 1.3 cm, GLS -18% (possible amyloid strain pattern), normal RV, mild-mod MR, mild TR, PASP 20 mmHg. [de-identified] : 6/27/2024: Severe to distal disease, mid LAD 80%, D1 80%, LANA placed to both vessels.